# Patient Record
Sex: MALE | Race: WHITE | NOT HISPANIC OR LATINO | Employment: FULL TIME | URBAN - METROPOLITAN AREA
[De-identification: names, ages, dates, MRNs, and addresses within clinical notes are randomized per-mention and may not be internally consistent; named-entity substitution may affect disease eponyms.]

---

## 2017-10-04 ENCOUNTER — ALLSCRIPTS OFFICE VISIT (OUTPATIENT)
Dept: OTHER | Facility: OTHER | Age: 57
End: 2017-10-04

## 2017-10-06 NOTE — PROGRESS NOTES
Assessment  1  Type 2 diabetes mellitus with hyperglycemia (250 00) (E11 65)   2  Mixed hyperlipidemia (272 2) (E78 2)   3  Benign essential hypertension (401 1) (I10)   4  Influenza vaccine needed (V04 81) (Z23)    Plan   Benign essential hypertension    · Lisinopril 2 5 MG Oral Tablet; TAKE 1 TABLET EVERY DAY  Benign essential hypertension, Mixed hyperlipidemia, Type 2 diabetes mellitus with  hyperglycemia    · Follow-up visit in 3 months Evaluation and Treatment  Follow-up  Status: Hold For -  Scheduling  Requested for: 05Oct2017  Influenza vaccine needed    · Fluzone Quadrivalent Intramuscular Suspension  Mixed hyperlipidemia    · Simvastatin 40 MG Oral Tablet; Take 1 tablet daily  Mixed hyperlipidemia, Type 2 diabetes mellitus with hyperglycemia    · (Q) CBC (H/H, RBC, INDICES, WBC, PLT); Status:Active; Requested TKO:[id number];    · (Q) COMPREHENSIVE METABOLIC PNL W/ADJUSTED CALCIUM; Status:Active; Requested JDQ:[id number];    · (Q) HEMOGLOBIN A1c W/REFL TO [facility]); Status:Active; Requested  EMH:[id number];    · (Q) LIPID PANEL WITH REFLEX TO DIRECT LDL; Status:Active; Requested  EHS:[id number];    · (Q) MICROALBUMIN, RANDOM URINE (W/CREATININE); Status:Active; Requested  WPZ:[id number];   Prostate cancer screening    · (Q) PSA, TOTAL WITH REFLEX TO PSA, FREE; Status:Active; Requested TQB:[id number];   Screening for malignant neoplasm of colon    · *1 - [facility]  *  Status: Active   Requested for: 49FGZ4513  Care Summary provided  : Yes   · COLONOSCOPY; Status:Active; Requested for:04Oct2017;   Type 2 diabetes mellitus with hyperglycemia    · OneTouch Ultra Blue In Vitro Strip; TEST 8 TIMES DAILY   · HumaLOG KwikPen 100 UNIT/ML Subcutaneous Solution Pen-injector; inject 5  units based on carb counting SC after meals   · Lantus SoloStar 100 UNIT/ML Subcutaneous Solution Pen-injector; Inject 50  units in the morning   · MetFORMIN HCl - 500 MG Oral Tablet;  Take 1 tablet po BID   · *VB - Foot Exam; Status:Complete;   Done: 02BBS2152 06:22PM    *1 - [facility]  *  Status: Need Information - Financial Authorization  Requested for: [id number]  Ordered; For: Type 2 diabetes mellitus with hyperglycemia; Ordered By: Dangelo Patterson  Performed:   Due: 22HAH1971  Follow-up visit in 3 months Evaluation and Treatment  Follow-up  Status: Hold For - Scheduling  Requested for: 75NWC0301  Ordered; For: Mixed hyperlipidemia, Type 2 diabetes mellitus with hyperglycemia; Ordered By: Dangelo Patterson  Performed:   Due: 69PLP6720     Discussion/Summary    Patient is a 49-year-old male here today for med check for his chronic conditions  Unfortunately his last med check was 2015 and has not been compliant with taking his medications, getting blood work or following up in the office  has no recent blood work to review and his current status of his diabetes is unknown  Unfortunately patient continues to change the units on his Lantus on his own and is currently taking 50 units daily  He reports that he alternates between Humalog and novolog and I'm uncertain where he got the novolog from  It appears according to his past medication list that we had given him novolog, samples, in  but these would more than likely be   spent at least 50% of the visit discussing with patient the importance of compliance in all aspects of his diabetic care  He admits to continuing to eat fast food on a daily basis which looking back at previous office visits have been discussed with him dating back to at least   I once again offered him referral to nutritionist and he accepts so I did provide him with a referral to our diabetic nutritionist and advise he contact them  Medication is another aspect of his treatment and I told him that he must comply with his medication treatment and avoid switching and changing medications on his own   He has not been taking metformin since it is been prescribed for him in 2015 and I will have patient restart this to be taken one tablet twice a day  He will commit to his Lantus and I will maintain him currently at 50 units and Humalog before meals sticking to 5 units  He was advised to make sure he eats 3 meals a day  Hopefully nutrition will be able to get him on track with his dietary habits  He was provided with glucose monitoring logs and advised that he check his sugars every day including a fasting a m  and 1-2 hour postprandial after his largest meal  He should bring these logs with him to every visit  his hyperlipidemia, once again I do not know the current status of the condition as he has not had any recent blood work done  Unfortunately he admits that he also has not been taking his simvastatin  I reiterated to him the importance of maintaining good cholesterol levels to prevent cardiovascular disease  At this time I will have him restart simvastatin at current dose with medication refilled today  Reiterated the importance of avoiding fast food and eating high protein with lots of fruits and vegetables and foods that are low in fat   has been a diagnosis that has been using several times  His blood pressure is always been excellent and he is only taking 2 5 mg of lisinopril most likely more so for management of his diabetes  Unfortunately he has not been taking his lisinopril either and I educated patient about nephropathy and how uncontrolled diabetes can affect his kidneys  We will restart him on 2 5 mg of lisinopril daily  a cardiovascular standpoint I also advised that he restart his daily aspirin at 81 mg a day  he has not been taking any of his medications aside from his insulins intermittently I advised that we start fresh with all medications refilled   I gave him a printed prescription for [facility] blood work and explained to him how to fast  He will get the fasting blood work done in 3 months and follow-up shortly after to review together  He will need to have an eye exam  Foot exam up to date and done today  Screening colonoscopy ordered given  flu vaccine admin today  explained to patient kindly that if he does not follow up as discussed and get his fasting blood work done we will no longer be able to provide his care or prescribe his medications for him  Possible side effects of new medications were reviewed with the patient/guardian today  The treatment plan was reviewed with the patient/guardian  The patient/guardian understands and agrees with the treatment plan      History of Present Illness  The patient is here today for a follow-up visit  His diabetes mellitus type 2 is unstable  the patient is not adherent with his medication regimen -He denies medication side effects  (pt states he only takes 50units lantus in AM  humalog as needed, some days doesnt use it, however he states he also has novolog in his fridge that he may use or interchange  he checks sugars 2 x daily rarely, but hasnt checked it recently except for before appt today - He checked sugar 5 min after eating and was 176)  His hyperlipidemia has been unsure how lipids are - no recent BW  His LDL goal is 100 mg/dL  the patient is not adherent with his medication regimen -pt has not been taking simvastatin  He states when sugars get low he sweats and confused, occurs about once every other week  Symptoms: denies chest pain,-denies intermittent leg claudication,-denies dyspnea,-denies lower extremity edema,-denies exercise intolerance,-denies fatigue,-denies numbness of the feet,-denies foot pain,-denies a foot ulcer,-denies visual impairment,-denies muscle pain-and-denies muscle weakness  Associated symptoms include no polyuria,-no PND-and-no polydipsia-   The patient presents with complaints of no syncope (hx of syncope after drinking ETOH, outside in heat at a concert, took insulin but didnt eat  has not had syncopal episode since)  Lifestyle and Disease Management: Diet: He does not have a healthy diet  -pt admits that he eats excessive fast food, doesnt cook  Weight Issues: He does not have any weight concerns  Exercise: He does not exercise regularly  Smoking: He does not use tobacco  Alcohol: He consumes alcohol -social  Drug Use: He denies drug use  Goals: the patient is not doing well with his goals  -pt feels well but not compliant at all with any medications or recommendations for treatment of his conditions  Review of Systems    Constitutional: No fever or chills, feels well, no tiredness, no recent weight gain or weight loss  Eyes: No complaints of eye pain, no red eyes, no discharge from eyes, no itchy eyes  ENT: no complaints of earache, no hearing loss, no nosebleeds, no nasal discharge, no sore throat, no hoarseness  Cardiovascular: No complaints of slow heart rate, no fast heart rate, no chest pain, no palpitations, no leg claudication, no lower extremity  Respiratory: No complaints of shortness of breath, no wheezing, no cough, no SOB on exertion, no orthopnea or PND  Gastrointestinal: No complaints of abdominal pain, no constipation, no nausea or vomiting, no diarrhea or bloody stools  Genitourinary: No complaints of dysuria, no incontinence, no hesitancy, no nocturia, no genital lesion, no testicular pain  Musculoskeletal: No complaints of arthralgia, no myalgias, no joint swelling or stiffness, no limb pain or swelling  Integumentary: No complaints of skin rash or skin lesions, no itching, no skin wound, no dry skin  Neurological: No compliants of headache, no confusion, no convulsions, no numbness or tingling, no dizziness or fainting, no limb weakness, no difficulty walking  Psychiatric: Is not suicidal, no sleep disturbances, no anxiety or depression, no change in personality, no emotional problems     Endocrine: No complaints of proptosis, no hot flashes, no muscle weakness, no erectile dysfunction, no deepening of the voice, no feelings of weakness  Hematologic/Lymphatic: No complaints of swollen glands, no swollen glands in the neck, does not bleed easily, no easy bruising  Active Problems  1  Benign essential hypertension (401 1) (I10)   2  Mixed hyperlipidemia (272 2) (E78 2)   3  Type 2 diabetes mellitus with hyperglycemia (250 00) (E11 65)    Past Medical History  1  History of Acute otitis externa of left ear (380 10) (H60 502)   2  History of acute bronchitis (V12 69) (Z87 09)   3  History of acute sinusitis (V12 69) (Z87 09)   4  History of athlete's foot (V12 09) (Z86 19)   5  History of nausea (V12 79) (Z87 898)   6  History of type 2 diabetes mellitus (V12 29) (Z86 39)   7  History of Impacted cerumen of both ears (380 4) (H61 23)   8  History of Laceration Of Lower Leg (891 0)   9  History of Left ear pain (388 70) (H92 02)   10  History of Myalgia (729 1) (M79 1)   11  History of Otitis externa of right ear (380 10) (H60 91)   12  History of Screening for colon cancer (V76 51) (Z12 11)   13  History of Sore throat (462) (J02 9)   14  History of Type 2 diabetes mellitus with hyperglycemia (250 00) (E11 65)    Social History   · Alcohol use (V49 89) (Z78 9)   · Never a smoker  The social history was reviewed and updated today  Current Meds   1  Aspirin 81 MG TABS; Therapy: (Recorded:10Pke6769) to Recorded   2  HumaLOG KwikPen 100 UNIT/ML Subcutaneous Solution Pen-injector; inject 5-8 units   based on carb counting SC after meals; Therapy: 36TMF5361 to (Last Rx:94Npc0638)  Requested for: 29HOC1435 Ordered   3  Lantus SoloStar 100 UNIT/ML Subcutaneous Solution Pen-injector; INJECT 55 UNITS IN   THE MORNING; Therapy: 33KKY1148 to ()  Requested for: 32Zhw9807; Last   Rx:[id number] Ordered   4  Lisinopril 2 5 MG Oral Tablet; TAKE 1 TABLET EVERY DAY; Therapy: 93BAQ0172 to (Lora Buchanan)  Requested for: 11Aug2015; Last   Rx:11Aug2015 Ordered   5  MetFORMIN HCl - 500 MG Oral Tablet; Take 1 tablet po BID; Therapy: 55Fzw9650 to (Last [place])  Requested for: 06Uwi4608 Ordered   6  Simvastatin 40 MG Oral Tablet; Take 1 tablet daily; Therapy: 71ZLV8542 to (Last Rx:54Udv4956)  Requested for: 74YWD0410 Ordered    The medication list was reviewed and updated today  Allergies  1  Bactrim DS TABS    Vitals  Vital Signs    Recorded: 81OBZ5274 06:10PM   Temperature 96 3 F, Tympanic   Heart Rate 88   Pulse Quality Normal   Respiration Quality Normal   Respiration 16   Systolic 505, LUE, Sitting   Diastolic 64, LUE, Sitting   BP CUFF SIZE Large   Height 6 ft    Weight 161 lb 2 oz   BMI Calculated 21 85   BSA Calculated 1 94   O2 Saturation 98, RA     Physical Exam    Constitutional   General appearance: No acute distress, well appearing and well nourished  appears healthy,-within normal limits of ideal weight-and-appearance reflects stated age  Eyes   Conjunctiva and lids: No swelling, erythema, or discharge  Pulmonary   Respiratory effort: No increased work of breathing or signs of respiratory distress  Auscultation of lungs: Clear to auscultation, equal breath sounds bilaterally, no wheezes, no rales, no rhonci  Cardiovascular   Auscultation of heart: Normal rate and rhythm, normal S1 and S2, without murmurs  Examination of extremities for edema and/or varicosities: Normal  -pedal pulses 2+ B/L  Carotid pulses: Normal     Lymphatic   Palpation of lymph nodes in neck: No lymphadenopathy  Musculoskeletal   Gait and station: Normal     Psychiatric   Orientation to person, place and time: Normal     Mood and affect: Normal  -pt lacking insight  Additional Exam:  vitals reviewed - WNL  Socks and shoes removed, Right Foot Findings: dry, but no swelling, no tenderness, no erythematous, no warmth, without ulcers and without a callus   The right toes were normal    The sensory exam showed normal vibratory sensation at the level of the toes on the right  Normal tactile sensation with monofilament testing throughout the right foot  Socks and shoes removed, Left Foot Findings: dry, but no swelling, no tenderness, no erythematous, no warmth, without ulcers and without a callus  The left toes were normal    The sensory exam showed normal vibratory sensation at the level of the toes on the left  Normal tactile sensation with monofilament testing throughout the left foot  Pulses:   2+ in the dorsalis pedis on the right  Pulses:   2+ in the dorsalis pedis on the left  Assign Risk Category: 0: No loss of protective sensation, no deformity  No present risk  Results/Data  PHQ-9 Adult Depression Screening 73TJC9342 06:26PM User, 3D[employer]     Test Name Result Flag Reference   PHQ-9 Adult Depression Score 4     Over the last two weeks, how often have you been bothered by any of the following problems? Little interest or pleasure in doing things: Not at all - 0  Feeling down, depressed, or hopeless: Not at all - 0  Trouble falling or staying asleep, or sleeping too much: Several days - 1  Feeling tired or having little energy: Several days - 1  Poor appetite or over eating: Several days - 1  Feeling bad about yourself - or that you are a failure or have let yourself or your family down: Not at all - 0  Trouble concentrating on things, such as reading the newspaper or watching television: Several days - 1  Moving or speaking so slowly that other people could have noticed   Or the opposite -  being so fidgety or restless that you have been moving around a lot more than usual: Not at all - 0  Thoughts that you would be better off dead, or of hurting yourself in some way: Not at all - 0   PHQ-9 Adult Depression Screening Negative     PHQ-9 Difficulty Level Not difficult at all     PHQ-9 Severity Minimal Depression       *VB - Foot Exam 28SJF5576 06:22PM [facility]     Test Name Result Flag Reference   FOOT EXAM 10/4/2017-NORMAL         Signatures Electronically signed by : Jaimee Olea [facility]; Oct  5 2017 12:30PM EST                       (Author)    Electronically signed by : Aaron Simons DO; Oct  5 2017  1:02PM EST

## 2018-01-14 VITALS
RESPIRATION RATE: 16 BRPM | HEIGHT: 72 IN | DIASTOLIC BLOOD PRESSURE: 64 MMHG | SYSTOLIC BLOOD PRESSURE: 110 MMHG | TEMPERATURE: 96.3 F | OXYGEN SATURATION: 98 % | BODY MASS INDEX: 21.83 KG/M2 | HEART RATE: 88 BPM | WEIGHT: 161.13 LBS

## 2018-05-24 ENCOUNTER — OFFICE VISIT (OUTPATIENT)
Dept: FAMILY MEDICINE CLINIC | Facility: CLINIC | Age: 58
End: 2018-05-24
Payer: COMMERCIAL

## 2018-05-24 VITALS
DIASTOLIC BLOOD PRESSURE: 66 MMHG | HEART RATE: 76 BPM | WEIGHT: 164.2 LBS | OXYGEN SATURATION: 98 % | SYSTOLIC BLOOD PRESSURE: 130 MMHG | TEMPERATURE: 96.8 F | HEIGHT: 72 IN | BODY MASS INDEX: 22.24 KG/M2 | RESPIRATION RATE: 16 BRPM

## 2018-05-24 DIAGNOSIS — I10 BENIGN ESSENTIAL HYPERTENSION: ICD-10-CM

## 2018-05-24 DIAGNOSIS — Z11.59 NEED FOR HEPATITIS C SCREENING TEST: ICD-10-CM

## 2018-05-24 DIAGNOSIS — E78.2 MIXED HYPERLIPIDEMIA: ICD-10-CM

## 2018-05-24 DIAGNOSIS — Z12.5 SCREENING FOR PROSTATE CANCER: ICD-10-CM

## 2018-05-24 DIAGNOSIS — E11.65 TYPE 2 DIABETES MELLITUS WITH HYPERGLYCEMIA, UNSPECIFIED WHETHER LONG TERM INSULIN USE (HCC): Primary | ICD-10-CM

## 2018-05-24 LAB
LEFT EYE DIABETIC RETINOPATHY: NORMAL
RIGHT EYE DIABETIC RETINOPATHY: NORMAL
SL AMB POCT HEMOGLOBIN AIC: 8.8

## 2018-05-24 PROCEDURE — 2022F DILAT RTA XM EVC RTNOPTHY: CPT | Performed by: PHYSICIAN ASSISTANT

## 2018-05-24 PROCEDURE — 83036 HEMOGLOBIN GLYCOSYLATED A1C: CPT | Performed by: PHYSICIAN ASSISTANT

## 2018-05-24 PROCEDURE — 92250 FUNDUS PHOTOGRAPHY W/I&R: CPT | Performed by: PHYSICIAN ASSISTANT

## 2018-05-24 PROCEDURE — 3045F PR MOST RECENT HEMOGLOBIN A1C LEVEL 7.0-9.0%: CPT | Performed by: PHYSICIAN ASSISTANT

## 2018-05-24 PROCEDURE — 3072F LOW RISK FOR RETINOPATHY: CPT | Performed by: PHYSICIAN ASSISTANT

## 2018-05-24 PROCEDURE — 99214 OFFICE O/P EST MOD 30 MIN: CPT | Performed by: PHYSICIAN ASSISTANT

## 2018-05-24 RX ORDER — INSULIN GLARGINE 100 [IU]/ML
45 INJECTION, SOLUTION SUBCUTANEOUS DAILY
COMMUNITY
Start: 2018-05-22 | End: 2018-10-15 | Stop reason: SDUPTHER

## 2018-05-24 RX ORDER — SIMVASTATIN 40 MG
1 TABLET ORAL DAILY
COMMUNITY
Start: 2012-09-18 | End: 2018-07-14 | Stop reason: SDUPTHER

## 2018-05-24 RX ORDER — LISINOPRIL 2.5 MG/1
1 TABLET ORAL DAILY
COMMUNITY
Start: 2013-01-29 | End: 2018-07-14 | Stop reason: SDUPTHER

## 2018-05-24 NOTE — PROGRESS NOTES
Assessment/Plan:    Type 2 diabetes mellitus with hyperglycemia (HCC)  poc A1C 8 8, still uncontrolled  He is noncompliant with insulin, as well as diet  Will decrease lantus from 50units to 45units, but stressed need to use humalog before every meal he eats  Gave him paper with instructions written of what sugars to check (fasting AM and 1hr postprandial)  He also should f/u with nutritionist for help managing diet  On ACE, statin, foot exam UTD  Eye exam in office today  Benign essential hypertension  130/66 today, more so on ACE for diabetes  Will monitor  Mixed hyperlipidemia  Compliant on simvastatin  Will check lipids soon  Diagnoses and all orders for this visit:    Type 2 diabetes mellitus with hyperglycemia, unspecified whether long term insulin use (HCC)  -     POCT hemoglobin A1c  -     glucose blood (ONE TOUCH ULTRA TEST) test strip; Test blood sugars 3 times a day  -     CBC and differential; Future  -     Comprehensive metabolic panel; Future  -     Lipid panel; Future  -     PSA, total and free; Future  -     CBC and differential  -     Comprehensive metabolic panel  -     Lipid panel  -     PSA, total and free  -     POCT diabetic eye exam    Benign essential hypertension  -     CBC and differential; Future  -     Comprehensive metabolic panel; Future  -     Lipid panel; Future  -     PSA, total and free; Future  -     CBC and differential  -     Comprehensive metabolic panel  -     Lipid panel  -     PSA, total and free    Mixed hyperlipidemia  -     CBC and differential; Future  -     Comprehensive metabolic panel; Future  -     Lipid panel; Future  -     PSA, total and free; Future  -     CBC and differential  -     Comprehensive metabolic panel  -     Lipid panel  -     PSA, total and free    Screening for prostate cancer  -     PSA, total and free;  Future  -     PSA, total and free    Need for hepatitis C screening test  -     Hepatitis C Ab W/Refl To HCV RNA, Qn, PCR; Future  - Hepatitis C Ab W/Refl To HCV RNA, Qn, PCR    Other orders  -     aspirin 81 MG tablet; Take by mouth  -     insulin lispro (HUMALOG KWIKPEN) 100 Units/mL SOPN; Inject under the skin  -     LANTUS SOLOSTAR injection pen 100 units/mL;   -     lisinopril (ZESTRIL) 2 5 mg tablet; Take 1 tablet by mouth daily  -     metFORMIN (GLUCOPHAGE) 500 mg tablet; Take 1 tablet by mouth 2 (two) times a day  -     Discontinue: glucose blood (ONE TOUCH ULTRA TEST) test strip; by In Vitro route  -     simvastatin (ZOCOR) 40 mg tablet; Take 1 tablet by mouth daily        Patient is a 59-year-old male here for reported low blood sugars and not feeling well  He states he had 1 blood sugar in the middle the night that was 48 and woke confused and sweating, improved after eating  He states he had 2 other episodes in the middle the night very similar but did not check his sugars  Unfortunately patient has a non compliant past and still has yet to get blood work done after emphasizing the importance of compliance at his last appointment with me in October 2017  Point of care A1c collected today was 8 8, still uncontrolled  I question patient great detail today regarding his compliance yet again with his medications, in particular his insulin  He admits to fairly decent compliance with his Lantus 50 units in the morning  However it seems he misses his Humalog quite frequently and unfortunately he continues with a very poor diet which I feel is where his issue is with his poorly controlled diabetes  I explained to patient that  With diabetes and is insulin use he needs to be very strict and compliant with using his insulin but also with his eating  I feel that his A1c is higher most likely because of high post prandials but possibly his Lantus is too much in general during his fasting times    I have asked patient multiple times to keep a blood sugar log both fasting and postprandial and was given a log book at last appointment but he states he did not understand  He also did not get blood work done from October because he states he did not understand was on the paper and he has not had any blood work done since 2015  At this point I will decrease his Lantus to 45 units  I have advised 5 units of Humalog 15 minutes before his meals, and he is only to skip the Humalog if he skips his meals, which he should not be skipping meals regardless  I told pt he should be fasting 12 hours for his BW and told him not to worry about what the paper says, just to take it to the lab  He will continue all orals  We will f/u with him in 1 month, he is to get FBW prior  He was sent over for an eye exam today  Will discuss results at f/u  Foot exam UTD  Chief Complaint   Patient presents with    Hypoglycemia     missed work on tuesday was 48       Subjective:      Patient ID: Trisha Jones is a 62 y o  male     59y/o male here today for f/u to his chronic conditions, concerned because he had 3 low BS and wasn't feel well, feeling tired and run down  Also work stress  States all 3 low BS was in middle of night, states he was confused and sweaty (was 48 one time, but didn't check other times)  States didn't feel well for 4 hours  Currently taking 50 units of lantus in AM  He is still eating TV dinners  Breakfast he eats jelly sandwhich or Kinyarwanda toast in AM, then sandwhich for lunch  He eats popcorn and cucumber at night  He admits sometimes frequently he does not use his humalog before meals, he doesn't know why  When he does use humalog, he states he uses carb count method  There are days he doesn't use it at all  Drinks 2-3 bottles of water and occasional beer at night (usually at least 40oz/night)  He still has not gotten FBW done from when I last saw him oct 2017, as well as did not schedule colonoscopy, because he states he did not understand what was on the papers          The following portions of the patient's history were reviewed and updated as appropriate: allergies, current medications, past family history, past medical history, past social history, past surgical history and problem list     Review of Systems   Constitutional: Positive for fatigue  Respiratory: Negative  Cardiovascular: Negative  Gastrointestinal: Negative  Genitourinary: Negative  Neurological:        Reports some confusion with low BS   Psychiatric/Behavioral: Negative  Objective:      /66 (BP Location: Left arm, Cuff Size: Adult)   Pulse 76   Temp (!) 96 8 °F (36 °C) (Tympanic)   Resp 16   Ht 6' (1 829 m)   Wt 74 5 kg (164 lb 3 2 oz)   SpO2 98%   BMI 22 27 kg/m²          Physical Exam   Constitutional: He is oriented to person, place, and time  He appears well-developed and well-nourished  Neck: Neck supple  Normal carotid pulses present  Carotid bruit is not present  Cardiovascular: Normal rate, regular rhythm, normal heart sounds and normal pulses  Pulmonary/Chest: Effort normal and breath sounds normal    Abdominal: Normal appearance and bowel sounds are normal  There is no tenderness  Lymphadenopathy:     He has no cervical adenopathy  Neurological: He is alert and oriented to person, place, and time  Psychiatric: He has a normal mood and affect  Vitals reviewed

## 2018-05-24 NOTE — ASSESSMENT & PLAN NOTE
poc A1C 8 8, still uncontrolled  He is noncompliant with insulin, as well as diet  Will decrease lantus from 50units to 45units, but stressed need to use humalog before every meal he eats  Gave him paper with instructions written of what sugars to check (fasting AM and 1hr postprandial)  He also should f/u with nutritionist for help managing diet  On ACE, statin, foot exam UTD  Eye exam in office today

## 2018-05-30 LAB
ALBUMIN SERPL-MCNC: 4.2 G/DL (ref 3.6–5.1)
ALBUMIN/GLOB SERPL: 1.5 (CALC) (ref 1–2.5)
ALP SERPL-CCNC: 77 U/L (ref 40–115)
ALT SERPL-CCNC: 20 U/L (ref 9–46)
AST SERPL-CCNC: 13 U/L (ref 10–35)
BASOPHILS # BLD AUTO: 33 CELLS/UL (ref 0–200)
BASOPHILS NFR BLD AUTO: 0.5 %
BILIRUB SERPL-MCNC: 1.7 MG/DL (ref 0.2–1.2)
BUN SERPL-MCNC: 14 MG/DL (ref 7–25)
BUN/CREAT SERPL: ABNORMAL (CALC) (ref 6–22)
CALCIUM SERPL-MCNC: 9.4 MG/DL (ref 8.6–10.3)
CHLORIDE SERPL-SCNC: 101 MMOL/L (ref 98–110)
CHOLEST SERPL-MCNC: 214 MG/DL
CHOLEST/HDLC SERPL: 3 (CALC)
CO2 SERPL-SCNC: 31 MMOL/L (ref 20–31)
CREAT SERPL-MCNC: 0.77 MG/DL (ref 0.7–1.33)
EOSINOPHIL # BLD AUTO: 59 CELLS/UL (ref 15–500)
EOSINOPHIL NFR BLD AUTO: 0.9 %
ERYTHROCYTE [DISTWIDTH] IN BLOOD BY AUTOMATED COUNT: 12.7 % (ref 11–15)
GLOBULIN SER CALC-MCNC: 2.8 G/DL (CALC) (ref 1.9–3.7)
GLUCOSE SERPL-MCNC: 83 MG/DL (ref 65–99)
HCT VFR BLD AUTO: 48.3 % (ref 38.5–50)
HCV AB S/CO SERPL IA: 0
HCV AB SERPL QL IA: NORMAL
HDLC SERPL-MCNC: 71 MG/DL
HGB BLD-MCNC: 16.1 G/DL (ref 13.2–17.1)
LDLC SERPL CALC-MCNC: 128 MG/DL (CALC)
LYMPHOCYTES # BLD AUTO: 1450 CELLS/UL (ref 850–3900)
LYMPHOCYTES NFR BLD AUTO: 22.3 %
MCH RBC QN AUTO: 28.9 PG (ref 27–33)
MCHC RBC AUTO-ENTMCNC: 33.3 G/DL (ref 32–36)
MCV RBC AUTO: 86.6 FL (ref 80–100)
MONOCYTES # BLD AUTO: 468 CELLS/UL (ref 200–950)
MONOCYTES NFR BLD AUTO: 7.2 %
NEUTROPHILS # BLD AUTO: 4492 CELLS/UL (ref 1500–7800)
NEUTROPHILS NFR BLD AUTO: 69.1 %
NONHDLC SERPL-MCNC: 143 MG/DL (CALC)
PLATELET # BLD AUTO: 260 THOUSAND/UL (ref 140–400)
PMV BLD REES-ECKER: 10.2 FL (ref 7.5–12.5)
POTASSIUM SERPL-SCNC: 4.5 MMOL/L (ref 3.5–5.3)
PROT SERPL-MCNC: 7 G/DL (ref 6.1–8.1)
PSA FREE MFR SERPL: 17 % (CALC)
PSA FREE SERPL-MCNC: 0.1 NG/ML
PSA SERPL-MCNC: 0.6 NG/ML
RBC # BLD AUTO: 5.58 MILLION/UL (ref 4.2–5.8)
SL AMB EGFR AFRICAN AMERICAN: 117 ML/MIN/1.73M2
SL AMB EGFR NON AFRICAN AMERICAN: 101 ML/MIN/1.73M2
SODIUM SERPL-SCNC: 139 MMOL/L (ref 135–146)
TRIGL SERPL-MCNC: 54 MG/DL
WBC # BLD AUTO: 6.5 THOUSAND/UL (ref 3.8–10.8)

## 2018-06-25 ENCOUNTER — OFFICE VISIT (OUTPATIENT)
Dept: FAMILY MEDICINE CLINIC | Facility: CLINIC | Age: 58
End: 2018-06-25
Payer: COMMERCIAL

## 2018-06-25 VITALS
RESPIRATION RATE: 16 BRPM | HEIGHT: 72 IN | SYSTOLIC BLOOD PRESSURE: 134 MMHG | WEIGHT: 163.1 LBS | TEMPERATURE: 98.7 F | HEART RATE: 95 BPM | DIASTOLIC BLOOD PRESSURE: 68 MMHG | OXYGEN SATURATION: 97 % | BODY MASS INDEX: 22.09 KG/M2

## 2018-06-25 DIAGNOSIS — E11.65 TYPE 2 DIABETES MELLITUS WITH HYPERGLYCEMIA, UNSPECIFIED WHETHER LONG TERM INSULIN USE (HCC): Primary | ICD-10-CM

## 2018-06-25 PROCEDURE — 99213 OFFICE O/P EST LOW 20 MIN: CPT | Performed by: PHYSICIAN ASSISTANT

## 2018-06-25 NOTE — ASSESSMENT & PLAN NOTE
BP elevated originally but recheck is 134/68  I will continue monitoring blood pressure and increase lisinopril if necessary

## 2018-06-25 NOTE — ASSESSMENT & PLAN NOTE
Lab Results   Component Value Date    HGBA1C 8 8 05/24/2018       Patient's A1c is 8 8 but his fasting sugar was recorded at 83  He has  Decreased his daily Lantus to 45 units a day and I have advised 5 units of Humalog before every meal when he eats the patient seems he has still not been compliant with this  He unfortunately has not brought in his log book that I gave him at his last appointment a month ago and admits he is not been compliant with checking sugars at home  I believe his poorly controlled diabetes is  Most likely secondary to his poor diet and spiked post prandials as well as his issues with eating and consistently and missing meals which can cause hypoglycemia  This has all been discussed with patient before and I told him I will be able to better help him with his diabetes when he is able to eat regularly breakfast lunch and dinner, avoiding eating excessive sugars and carbohydrates and keeping track of his fasting a m  Sugar and 1-2 hour post prandials after his meals  He is to record them in the log book I gave him and follow-up in 6-8 weeks

## 2018-06-25 NOTE — PROGRESS NOTES
Assessment/Plan:    Type 2 diabetes mellitus with hyperglycemia (HCC)  Lab Results   Component Value Date    HGBA1C 8 8 05/24/2018       Patient's A1c is 8 8 but his fasting sugar was recorded at 83  He has  Decreased his daily Lantus to 45 units a day and I have advised 5 units of Humalog before every meal when he eats the patient seems he has still not been compliant with this  He unfortunately has not brought in his log book that I gave him at his last appointment a month ago and admits he is not been compliant with checking sugars at home  I believe his poorly controlled diabetes is  Most likely secondary to his poor diet and spiked post prandials as well as his issues with eating and consistently and missing meals which can cause hypoglycemia  This has all been discussed with patient before and I told him I will be able to better help him with his diabetes when he is able to eat regularly breakfast lunch and dinner, avoiding eating excessive sugars and carbohydrates and keeping track of his fasting a m  Sugar and 1-2 hour post prandials after his meals  He is to record them in the log book I gave him and follow-up in 6-8 weeks  Benign essential hypertension    BP elevated originally but recheck is 134/68  I will continue monitoring blood pressure and increase lisinopril if necessary  Mixed hyperlipidemia    Total cholesterol 214, HDL 71, triglycerides 54,  on 40 mg of simvastatin  Ratio 3 0       Diagnoses and all orders for this visit:    Type 2 diabetes mellitus with hyperglycemia, unspecified whether long term insulin use [facility])       60-year-old male here today for follow-up of diabetes and to review fasting and postprandial sugars  Unfortunately patient admits he has not been compliant about checking his sugars at home and did not bring the log book with him today  He states that he works as a  and his hands are dirty a lot so he is afraid to test his sugars    I told patient to at least check fasting a m  Sugars before he goes to work and then he can check postprandial on the weekends if necessary  I did give him some alcohol pads that he can clean his fingers before testing  I told him that the blood sugars are essential in determining how I can further manage his diabetes as his fasting sugar for the blood work was excellent at 83  He also has still been noncompliant with his diet, eating higher carbs /breads as well as sugary foods such as jelly and he states he has also been skipping meals as well which I advised him not to do on insulins  I do not feel he will be compliant seeing an endocrinologist for further management as he states he was unable to see nutritionist due to the hours  Patient advised to check his sugars as instructed and to bring in the log book and we will see him back in 4-6 weeks  I did decrease his Lantus at his last appointment due to hypoglycemic episodes from 50 units to 45 units and he was supposed to use 5 units of Humalog prior to his meals which I do not think he is doing but reiterated to him this change  Chief Complaint   Patient presents with    Follow-up       Subjective:      Patient ID: Dillon Cogan is a 62 y o  male     59y/o male here today for diabetes recheck  He has not checked his sugars at home, stating he cannot at work because his fingers are dirty  He did not bring a log  He states "i eat the same thing every day " He was not able to make appt with nutritionist because it doesn't fit his schedule  The following portions of the patient's history were reviewed and updated as appropriate: allergies, current medications, past family history, past medical history, past social history, past surgical history and problem list     Review of Systems   Constitutional: Negative  Respiratory: Negative  Cardiovascular: Negative  Gastrointestinal: Negative  Genitourinary: Negative  Neurological: Negative  Psychiatric/Behavioral: Negative  Objective:      /68   Pulse 95   Temp 98 7 °F (37 1 °C) (Tympanic)   Resp 16   Ht 6' (1 829 m)   Wt 74 kg (163 lb 1 6 oz)   SpO2 97%   BMI 22 12 kg/m²          Physical Exam   Constitutional: He is oriented to person, place, and time  He appears well-developed and well-nourished  Neck: Neck supple  Cardiovascular: Normal rate, normal heart sounds and intact distal pulses  No LE swelling   Pulmonary/Chest: Effort normal and breath sounds normal    Lymphadenopathy:     He has no cervical adenopathy  Neurological: He is alert and oriented to person, place, and time  Psychiatric: He has a normal mood and affect  Vitals reviewed

## 2018-07-14 DIAGNOSIS — E78.2 MIXED HYPERLIPIDEMIA: ICD-10-CM

## 2018-07-14 DIAGNOSIS — Z79.4 TYPE 2 DIABETES MELLITUS WITHOUT COMPLICATION, WITH LONG-TERM CURRENT USE OF INSULIN (HCC): Primary | ICD-10-CM

## 2018-07-14 DIAGNOSIS — E11.9 TYPE 2 DIABETES MELLITUS WITHOUT COMPLICATION, WITH LONG-TERM CURRENT USE OF INSULIN (HCC): Primary | ICD-10-CM

## 2018-07-16 PROCEDURE — 4010F ACE/ARB THERAPY RXD/TAKEN: CPT | Performed by: PHYSICIAN ASSISTANT

## 2018-07-16 RX ORDER — LISINOPRIL 2.5 MG/1
TABLET ORAL
Qty: 90 TABLET | Refills: 1 | Status: SHIPPED | OUTPATIENT
Start: 2018-07-16 | End: 2019-12-13 | Stop reason: SDUPTHER

## 2018-07-16 RX ORDER — SIMVASTATIN 40 MG
TABLET ORAL
Qty: 90 TABLET | Refills: 1 | Status: SHIPPED | OUTPATIENT
Start: 2018-07-16 | End: 2019-05-27 | Stop reason: SDUPTHER

## 2018-09-24 ENCOUNTER — OFFICE VISIT (OUTPATIENT)
Dept: FAMILY MEDICINE CLINIC | Facility: CLINIC | Age: 58
End: 2018-09-24
Payer: COMMERCIAL

## 2018-09-24 VITALS
OXYGEN SATURATION: 98 % | HEIGHT: 71 IN | SYSTOLIC BLOOD PRESSURE: 124 MMHG | DIASTOLIC BLOOD PRESSURE: 68 MMHG | BODY MASS INDEX: 22.71 KG/M2 | WEIGHT: 162.2 LBS | HEART RATE: 85 BPM | RESPIRATION RATE: 17 BRPM | TEMPERATURE: 97.8 F

## 2018-09-24 DIAGNOSIS — E78.2 MIXED HYPERLIPIDEMIA: ICD-10-CM

## 2018-09-24 DIAGNOSIS — Z12.11 SCREENING FOR COLON CANCER: ICD-10-CM

## 2018-09-24 DIAGNOSIS — Z23 NEEDS FLU SHOT: ICD-10-CM

## 2018-09-24 DIAGNOSIS — E11.649 TYPE 2 DIABETES MELLITUS WITH HYPOGLYCEMIA WITHOUT COMA, WITH LONG-TERM CURRENT USE OF INSULIN (HCC): Primary | ICD-10-CM

## 2018-09-24 DIAGNOSIS — Z79.4 TYPE 2 DIABETES MELLITUS WITH HYPOGLYCEMIA WITHOUT COMA, WITH LONG-TERM CURRENT USE OF INSULIN (HCC): Primary | ICD-10-CM

## 2018-09-24 DIAGNOSIS — I10 BENIGN ESSENTIAL HYPERTENSION: ICD-10-CM

## 2018-09-24 LAB
RIGHT EYE DIABETIC RETINOPATHY: NORMAL
RIGHT EYE IMAGE QUALITY: NORMAL
SEVERITY (EYE EXAM): NORMAL

## 2018-09-24 PROCEDURE — 99214 OFFICE O/P EST MOD 30 MIN: CPT | Performed by: PHYSICIAN ASSISTANT

## 2018-09-24 PROCEDURE — 3074F SYST BP LT 130 MM HG: CPT | Performed by: PHYSICIAN ASSISTANT

## 2018-09-24 PROCEDURE — 3078F DIAST BP <80 MM HG: CPT | Performed by: PHYSICIAN ASSISTANT

## 2018-09-24 PROCEDURE — 90682 RIV4 VACC RECOMBINANT DNA IM: CPT | Performed by: PHYSICIAN ASSISTANT

## 2018-09-24 PROCEDURE — 3072F LOW RISK FOR RETINOPATHY: CPT | Performed by: PHYSICIAN ASSISTANT

## 2018-09-24 PROCEDURE — 90471 IMMUNIZATION ADMIN: CPT | Performed by: PHYSICIAN ASSISTANT

## 2018-09-24 NOTE — PROGRESS NOTES
Assessment/Plan:    Type 2 diabetes mellitus with hyperglycemia ([facility] [address] )  Lab Results   Component Value Date    HGBA1C 8 8 05/24/2018         Patient's last point of care A1c 8 8 May 2018  Since that time he has been checking fasting a m  Sugars which have ranged widely from 50s to 170s  However he is noted to fasting sugar approximately 49 in the morning for which she was symptomatic  And other fasting sugar he had reported at 2:11 a m  Rosella Goldmann Unfortunately this is a difficult case in which she is not compliant with diet and seems somewhat noncompliant with his insulins  At this time I will refer him to Endocrinology for further help in his diabetic treatment  Last IRIS 5/2018, foot exam today  Pt on ACE and statin  Continue current med tx for now  Benign essential hypertension  BP normal today 124/68, mainly on ACE for diabetes kidney protection    Mixed hyperlipidemia  Compliant on statin  Will check lipids with upcoming BW  Diagnoses and all orders for this visit:    Type 2 diabetes mellitus with hypoglycemia without coma, with long-term current use of insulin ([facility] )  -     Cancel: Microalbumin,Urine  -     Ambulatory referral to Endocrinology; Future  -     CBC and differential; Future  -     Comprehensive metabolic panel  -     Lipid panel; Future  -     Hemoglobin A1c (w/out EAG); Future  -     CBC and differential  -     Lipid panel  -     Hemoglobin A1c (w/out EAG)  -     Microalbumin,Urine    Benign essential hypertension  -     CBC and differential; Future  -     Comprehensive metabolic panel  -     Lipid panel; Future  -     Hemoglobin A1c (w/out EAG); Future  -     CBC and differential  -     Lipid panel  -     Hemoglobin A1c (w/out EAG)    Mixed hyperlipidemia  -     CBC and differential; Future  -     Comprehensive metabolic panel  -     Lipid panel; Future  -     Hemoglobin A1c (w/out EAG);  Future  -     CBC and differential  -     Lipid panel  -     Hemoglobin A1c (w/out EAG)    Screening for colon cancer  -     Ambulatory referral to Gastroenterology; Future    Needs flu shot  -     influenza vaccine, [id number], quadrivalent, recombinant, PF, 0 5 mL, for patients 18 yr+ (FLUBLOK)        Patient is a 54-year-old male presenting today for close diabetic follow-up  I reviewed his fasting blood sugars with him today at which have a very wide range both in hypoglycemic and hyperglycemic territory  He reports compliance with medications though it seems he is not quite compliant with his insulins  I will ask for help from endocrinology for further management of his diabetes, especially since his last A1c was 8 8  He is overdue for fasting blood work so I will have him get CBC, CMP, hemoglobin A1c and lipids  Urine collected today for microalbumin so that is up-to-date  He is on an Ace and statin  Patient given referral and will contact Endocrinology for appointment  Iris exam up-to-date performed May 2018  Foot exam updated today  Flu vaccine administered  Follow-up in 4 months  Chief Complaint   Patient presents with    Follow-up     to [facility] condition diabetes type 2        Subjective:      Patient ID: Fidelina Barron is a 62 y o  male     57y/o male here today for f/u to diabetes  He continues eating TV dinners and fast food because he states he is broke  He states it is difficult to eat regularly with work and running around, also states hard to check sugars at work because hands get very dirty  He has checked AM sugars over past few weeks, no postprandial: On 9/17 felt sweaty and sugar was 49  He took sugar pill and ate  tasty cake and felt well after  Sugars ranging mainly from 50's to 170's, highest 211  He reports compliance on oral medications, he does not always take his humalog, states it varies based on if he eats or not and carb counting, he states  He has no new concerns today          The following portions of the patient's history were reviewed and updated as appropriate: allergies, current medications, past family history, past medical history, past social history, past surgical history and problem list     Review of Systems   Constitutional: Negative  Respiratory: Negative  Cardiovascular: Negative  Gastrointestinal: Negative  Genitourinary: Negative  Neurological: Negative  Psychiatric/Behavioral: Negative  Objective:      /68   Pulse 85   Temp 97 8 °F (36 6 °C) (Tympanic)   Resp 17   Ht 5' 11 26" (1 81 m)   Wt 73 6 kg (162 lb 3 2 oz)   SpO2 98%   BMI 22 46 kg/m²          Physical Exam   Constitutional: He is oriented to person, place, and time  He appears well-developed and well-nourished  No distress  Neck: Neck supple  Normal carotid pulses present  Carotid bruit is not present  Cardiovascular: Normal rate, regular rhythm, normal heart sounds and intact distal pulses  Pulses are no weak pulses  Pulses:       Dorsalis pedis pulses are 1+ on the right side, and 1+ on the left side  Pulmonary/Chest: Effort normal and breath sounds normal    Feet:   Right Foot:   Skin Integrity: Negative for ulcer, skin breakdown, erythema, warmth, callus or dry skin  Left Foot:   Skin Integrity: Negative for ulcer, skin breakdown, erythema, warmth, callus or dry skin  Neurological: He is alert and oriented to person, place, and time  Skin: Skin is intact  Psychiatric: He has a normal mood and affect  Vitals reviewed  Patient's shoes and socks removed  Right Foot/Ankle   Right Foot Inspection  Skin Exam: skin normal and skin intact no dry skin, no warmth, no callus, no erythema, no maceration, no abnormal color, no pre-ulcer, no ulcer and no callus                          Toe Exam: ROM and strength within normal limits  Sensory   Vibration: intact  Proprioception: intact   Monofilament testing: intact  Vascular  Capillary refills: < 3 seconds  The right DP pulse is 1+       Left Foot/Ankle  Left Foot Inspection  Skin Exam: skin normal and skin intactno dry skin, no warmth, no erythema, no maceration, normal color, no pre-ulcer, no ulcer and no callus                         Toe Exam: ROM and strength within normal limits                   Sensory   Vibration: intact  Proprioception: intact  Monofilament: intact  Vascular  Capillary refills: < 3 seconds  The left DP pulse is 1+  Assign Risk Category:  No deformity present; No loss of protective sensation;  No weak pulses       Risk: 0

## 2018-09-25 LAB — MICROALBUMIN UR-MCNC: 0.4 MG/DL

## 2018-09-27 NOTE — ASSESSMENT & PLAN NOTE
Lab Results   Component Value Date    HGBA1C 8 8 05/24/2018         Patient's last point of care A1c 8 8 May 2018  Since that time he has been checking fasting a m  Sugars which have ranged widely from 50s to 170s  However he is noted to fasting sugar approximately 49 in the morning for which she was symptomatic  And other fasting sugar he had reported at 2:11 a m  Melissa Martin Unfortunately this is a difficult case in which she is not compliant with diet and seems somewhat noncompliant with his insulins  At this time I will refer him to Endocrinology for further help in his diabetic treatment  Last IRIS 5/2018, foot exam today  Pt on ACE and statin  Continue current med tx for now

## 2018-09-28 ENCOUNTER — OFFICE VISIT (OUTPATIENT)
Dept: FAMILY MEDICINE CLINIC | Facility: CLINIC | Age: 58
End: 2018-09-28
Payer: COMMERCIAL

## 2018-09-28 VITALS
RESPIRATION RATE: 16 BRPM | TEMPERATURE: 97.4 F | WEIGHT: 159.2 LBS | HEART RATE: 86 BPM | SYSTOLIC BLOOD PRESSURE: 118 MMHG | HEIGHT: 72 IN | BODY MASS INDEX: 21.56 KG/M2 | OXYGEN SATURATION: 97 % | DIASTOLIC BLOOD PRESSURE: 50 MMHG

## 2018-09-28 DIAGNOSIS — J06.9 ACUTE UPPER RESPIRATORY INFECTION: Primary | ICD-10-CM

## 2018-09-28 PROCEDURE — 99213 OFFICE O/P EST LOW 20 MIN: CPT | Performed by: PHYSICIAN ASSISTANT

## 2018-09-28 RX ORDER — AZITHROMYCIN 250 MG/1
TABLET, FILM COATED ORAL
Qty: 6 TABLET | Refills: 0 | Status: SHIPPED | OUTPATIENT
Start: 2018-09-28 | End: 2018-10-02

## 2018-09-28 NOTE — PROGRESS NOTES
Assessment/Plan:         Diagnoses and all orders for this visit:    Acute upper respiratory infection  -     azithromycin (ZITHROMAX) 250 mg tablet; Take 2 tablets today then 1 tablet daily x 4 days      Discussed OTC cold meds  Fever Care, ER instructions given  F/U 5-7 days if not resolved  Chief Complaint   Patient presents with    Headache     joints hurt    Cold Like Symptoms    Nasal Congestion       Subjective:      Patient ID: Taryn Levine is a 62 y o  male  Pt presents with one day history of nasal congestion, sinus headaches, joint pain/soreness, scratchy throat, PND  Denies cough, fever, chills, N/V/D  He has taken Mucinex, Tylenol, and a decongestant  Denies hx of asthma/allergies  Does not smoke  He did receive the flu shot  The following portions of the patient's history were reviewed and updated as appropriate: He  has a past medical history of Diabetes mellitus ([facility] [address] ); Hypertension; and Type 2 diabetes mellitus ([facility] )  He   Patient Active Problem List    Diagnosis Date Noted    Type 2 diabetes mellitus with hyperglycemia ([facility] ) 08/11/2015    Benign essential hypertension 09/10/2012    Mixed hyperlipidemia 09/10/2012     He  has no past surgical history on file  His family history includes Diabetes in his paternal aunt and paternal uncle; No Known Problems in his brother, mother, and sister; Stroke in his father  He  reports that he has never smoked  He has never used smokeless tobacco  He reports that he drinks about 0 6 oz of alcohol per week   He reports that he does not use drugs    Current Outpatient Prescriptions   Medication Sig Dispense Refill    aspirin 81 MG tablet Take 81 mg by mouth daily        glucose blood (ONE TOUCH ULTRA TEST) test strip Test blood sugars 3 times a day 100 each 2    insulin lispro (HUMALOG KWIKPEN) 100 Units/mL SOPN Inject 5 Units under the skin        LANTUS SOLOSTAR injection pen 100 units/mL Inject 45 Units under the skin daily        lisinopril (ZESTRIL) 2 5 mg tablet TAKE 1 TABLET DAILY 90 tablet 1    metFORMIN (GLUCOPHAGE) 500 mg tablet TAKE 1 TABLET TWICE A  tablet 1    simvastatin (ZOCOR) 40 mg tablet TAKE 1 TABLET DAILY 90 tablet 1    azithromycin (ZITHROMAX) 250 mg tablet Take 2 tablets today then 1 tablet daily x 4 days 6 tablet 0     No current facility-administered medications for this visit  Current Outpatient Prescriptions on File Prior to Visit   Medication Sig    aspirin 81 MG tablet Take 81 mg by mouth daily      glucose blood (ONE TOUCH ULTRA TEST) test strip Test blood sugars 3 times a day    insulin lispro (HUMALOG KWIKPEN) 100 Units/mL SOPN Inject 5 Units under the skin      LANTUS SOLOSTAR injection pen 100 units/mL Inject 45 Units under the skin daily      lisinopril (ZESTRIL) 2 5 mg tablet TAKE 1 TABLET DAILY    metFORMIN (GLUCOPHAGE) 500 mg tablet TAKE 1 TABLET TWICE A DAY    simvastatin (ZOCOR) 40 mg tablet TAKE 1 TABLET DAILY     No current facility-administered medications on file prior to visit  He is allergic to sulfamethoxazole-trimethoprim       Review of Systems   Constitutional: Negative  HENT: Positive for congestion, postnasal drip, sinus pain and sore throat  Respiratory: Negative  Cardiovascular: Negative  Gastrointestinal: Negative  Genitourinary: Negative  Musculoskeletal: Positive for arthralgias  Objective:      /50 (BP Location: Left arm, Patient Position: Sitting, Cuff Size: Adult)   Pulse 86   Temp (!) 97 4 °F (36 3 °C) (Tympanic)   Resp 16   Ht 6' 0 3" (1 836 m)   Wt 72 2 kg (159 lb 3 2 oz)   SpO2 97%   BMI 21 41 kg/m²          Physical Exam   Constitutional: He is oriented to person, place, and time  He appears well-developed and well-nourished  No distress     HENT:   Right Ear: Hearing, tympanic membrane, external ear and ear canal normal    Left Ear: Hearing, tympanic membrane, external ear and ear canal normal    Nose: Mucosal edema (B/L boggy turbinates) present  Mouth/Throat: Mucous membranes are normal  Posterior oropharyngeal erythema (PND) present  No oropharyngeal exudate  Neck: Neck supple  Cardiovascular: Normal rate, regular rhythm and normal heart sounds  Pulmonary/Chest: Effort normal and breath sounds normal    Lymphadenopathy:     He has no cervical adenopathy  Neurological: He is alert and oriented to person, place, and time  Psychiatric: He has a normal mood and affect  Vitals reviewed

## 2018-10-08 ENCOUNTER — OFFICE VISIT (OUTPATIENT)
Dept: FAMILY MEDICINE CLINIC | Facility: CLINIC | Age: 58
End: 2018-10-08
Payer: COMMERCIAL

## 2018-10-08 VITALS
SYSTOLIC BLOOD PRESSURE: 142 MMHG | BODY MASS INDEX: 21.14 KG/M2 | TEMPERATURE: 97.9 F | HEART RATE: 79 BPM | DIASTOLIC BLOOD PRESSURE: 76 MMHG | RESPIRATION RATE: 16 BRPM | OXYGEN SATURATION: 98 % | HEIGHT: 72 IN | WEIGHT: 156.1 LBS

## 2018-10-08 DIAGNOSIS — R11.0 NAUSEA: ICD-10-CM

## 2018-10-08 DIAGNOSIS — J01.91 ACUTE RECURRENT SINUSITIS, UNSPECIFIED LOCATION: Primary | ICD-10-CM

## 2018-10-08 PROCEDURE — 99213 OFFICE O/P EST LOW 20 MIN: CPT | Performed by: PHYSICIAN ASSISTANT

## 2018-10-08 PROCEDURE — 1036F TOBACCO NON-USER: CPT | Performed by: PHYSICIAN ASSISTANT

## 2018-10-08 RX ORDER — FLUTICASONE PROPIONATE 50 MCG
2 SPRAY, SUSPENSION (ML) NASAL DAILY
Qty: 16 G | Refills: 0 | Status: SHIPPED | OUTPATIENT
Start: 2018-10-08 | End: 2018-11-04 | Stop reason: SDUPTHER

## 2018-10-08 RX ORDER — CEFUROXIME AXETIL 500 MG/1
500 TABLET ORAL EVERY 12 HOURS SCHEDULED
Qty: 20 TABLET | Refills: 0 | Status: SHIPPED | OUTPATIENT
Start: 2018-10-08 | End: 2018-10-18

## 2018-10-08 NOTE — PROGRESS NOTES
Assessment/Plan:      Diagnoses and all orders for this visit:    Acute recurrent sinusitis, unspecified location  -     cefuroxime (CEFTIN) 500 mg tablet; Take 1 tablet (500 mg total) by mouth every 12 (twelve) hours for 10 days  -     fluticasone (FLONASE) 50 mcg/act nasal spray; 2 sprays into each nostril daily    Nausea        70-year-old male presenting today for follow-up to sinusitis  No better on azithromycin he was treated with on 09/28  He does have some nausea and an episode of dizziness with moving quick earlier today  There are no other accompanying GI symptoms to consider GI virus  I will treat him with a different antibiotic, Ceftin b i d  Times 10 days  He will continue Mucinex and Tylenol and I will also add fluticasone nasal spray which should hopefully help with nasal congestion and sinus pressure  Will see how he does through the course of the week and he is to call or follow up with symptoms  Chief Complaint   Patient presents with    Nausea     x today        Subjective:     Patient ID: Daniel Wood is a 62 y o  male     59y/o male here today for acute nausea today  Also congested and sinus pressure with some dizziness past week, was given zpack and doesn't feel better  He takes tylenol, mucinex  Denies abdominal pain  No vomiting  Decreased appetite  No diarrhea  No fevers  He sttaes sugars have been fine at home, 161 earlier today  Review of Systems   Constitutional: Positive for chills, diaphoresis and fatigue  Negative for fever  HENT: Positive for congestion and sinus pressure  Negative for sore throat  Respiratory: Negative  Cardiovascular: Negative  Gastrointestinal:        As in HPI   Neurological: Positive for dizziness  Psychiatric/Behavioral: Negative            The following portions of the patient's history were reviewed and updated as appropriate: allergies, current medications, past family history, past medical history, past social history, past surgical history and problem list       Objective:     Physical Exam   Constitutional: He is oriented to person, place, and time  He appears well-developed and well-nourished  HENT:   Head: Normocephalic  Nose: Mucosal edema and rhinorrhea present  Mouth/Throat: Oropharynx is clear and moist    Cerumen B/l with narrow ear canals  TM's not directly visualized  Neck: Neck supple  Cardiovascular: Normal rate, regular rhythm and normal heart sounds  Pulmonary/Chest: Effort normal and breath sounds normal    Lymphadenopathy:     He has no cervical adenopathy  Neurological: He is alert and oriented to person, place, and time  Psychiatric: He has a normal mood and affect  Vitals reviewed        Vitals:    10/08/18 1531   BP: 142/76   Pulse: 79   Resp: 16   Temp: 97 9 °F (36 6 °C)   TempSrc: Tympanic   SpO2: 98%   Weight: 70 8 kg (156 lb 1 6 oz)   Height: 6' (1 829 m)

## 2018-10-15 DIAGNOSIS — E11.65 UNCONTROLLED TYPE 2 DIABETES MELLITUS WITH HYPERGLYCEMIA (HCC): Primary | ICD-10-CM

## 2018-10-15 RX ORDER — INSULIN GLARGINE 100 [IU]/ML
INJECTION, SOLUTION SUBCUTANEOUS
Qty: 75 ML | Refills: 5 | Status: SHIPPED | OUTPATIENT
Start: 2018-10-15 | End: 2020-05-12 | Stop reason: SDUPTHER

## 2018-11-04 DIAGNOSIS — J01.91 ACUTE RECURRENT SINUSITIS, UNSPECIFIED LOCATION: ICD-10-CM

## 2018-11-05 RX ORDER — FLUTICASONE PROPIONATE 50 MCG
SPRAY, SUSPENSION (ML) NASAL
Qty: 1 BOTTLE | Refills: 1 | Status: SHIPPED | OUTPATIENT
Start: 2018-11-05 | End: 2020-01-13

## 2019-02-26 DIAGNOSIS — Z79.4 TYPE 2 DIABETES MELLITUS WITH HYPERGLYCEMIA, WITH LONG-TERM CURRENT USE OF INSULIN (HCC): Primary | ICD-10-CM

## 2019-02-26 DIAGNOSIS — E11.65 TYPE 2 DIABETES MELLITUS WITH HYPERGLYCEMIA, WITH LONG-TERM CURRENT USE OF INSULIN (HCC): Primary | ICD-10-CM

## 2019-02-26 RX ORDER — INSULIN LISPRO 100 [IU]/ML
INJECTION, SOLUTION INTRAVENOUS; SUBCUTANEOUS
Qty: 15 ML | Refills: 3 | Status: SHIPPED | OUTPATIENT
Start: 2019-02-26 | End: 2022-07-18 | Stop reason: SDUPTHER

## 2019-05-27 DIAGNOSIS — E78.2 MIXED HYPERLIPIDEMIA: ICD-10-CM

## 2019-05-29 RX ORDER — SIMVASTATIN 40 MG
TABLET ORAL
Qty: 90 TABLET | Refills: 0 | Status: SHIPPED | OUTPATIENT
Start: 2019-05-29 | End: 2019-12-13 | Stop reason: SDUPTHER

## 2019-08-27 DIAGNOSIS — E78.2 MIXED HYPERLIPIDEMIA: ICD-10-CM

## 2019-08-27 NOTE — TELEPHONE ENCOUNTER
Call patient - have him schedule an appt to be seen and ask him where he will have his blood work before appt - I will send enough Simvastatin to last until his appt, so let me know when it is made

## 2019-09-17 RX ORDER — SIMVASTATIN 40 MG
TABLET ORAL
Qty: 90 TABLET | Refills: 4 | OUTPATIENT
Start: 2019-09-17

## 2019-11-05 DIAGNOSIS — E11.65 UNCONTROLLED TYPE 2 DIABETES MELLITUS WITH HYPERGLYCEMIA (HCC): ICD-10-CM

## 2019-11-08 RX ORDER — INSULIN GLARGINE 100 [IU]/ML
INJECTION, SOLUTION SUBCUTANEOUS
Qty: 75 ML | Refills: 4 | OUTPATIENT
Start: 2019-11-08

## 2019-11-08 NOTE — TELEPHONE ENCOUNTER
Patient has not been seen in over a year  He needs to schedule a follow-up/reestablish care with a new provider  One scheduled I will get a 30 day supply  No other refills will be given until he is seen

## 2019-11-18 ENCOUNTER — TELEPHONE (OUTPATIENT)
Dept: FAMILY MEDICINE CLINIC | Facility: CLINIC | Age: 59
End: 2019-11-18

## 2019-12-09 ENCOUNTER — TELEPHONE (OUTPATIENT)
Dept: FAMILY MEDICINE CLINIC | Facility: CLINIC | Age: 59
End: 2019-12-09

## 2019-12-13 ENCOUNTER — OFFICE VISIT (OUTPATIENT)
Dept: FAMILY MEDICINE CLINIC | Facility: CLINIC | Age: 59
End: 2019-12-13
Payer: COMMERCIAL

## 2019-12-13 VITALS
OXYGEN SATURATION: 99 % | HEIGHT: 72 IN | SYSTOLIC BLOOD PRESSURE: 136 MMHG | HEART RATE: 74 BPM | WEIGHT: 157.2 LBS | BODY MASS INDEX: 21.29 KG/M2 | DIASTOLIC BLOOD PRESSURE: 72 MMHG | TEMPERATURE: 96.3 F

## 2019-12-13 DIAGNOSIS — E78.2 MIXED HYPERLIPIDEMIA: ICD-10-CM

## 2019-12-13 DIAGNOSIS — Z79.4 TYPE 2 DIABETES MELLITUS WITHOUT COMPLICATION, WITH LONG-TERM CURRENT USE OF INSULIN (HCC): ICD-10-CM

## 2019-12-13 DIAGNOSIS — E11.9 TYPE 2 DIABETES MELLITUS WITHOUT COMPLICATION, WITH LONG-TERM CURRENT USE OF INSULIN (HCC): ICD-10-CM

## 2019-12-13 DIAGNOSIS — E11.65 TYPE 2 DIABETES MELLITUS WITH HYPERGLYCEMIA, WITH LONG-TERM CURRENT USE OF INSULIN (HCC): Primary | ICD-10-CM

## 2019-12-13 DIAGNOSIS — Z23 NEED FOR VACCINATION: ICD-10-CM

## 2019-12-13 DIAGNOSIS — F10.10 ALCOHOL ABUSE: ICD-10-CM

## 2019-12-13 DIAGNOSIS — I10 BENIGN ESSENTIAL HYPERTENSION: ICD-10-CM

## 2019-12-13 DIAGNOSIS — Z79.4 TYPE 2 DIABETES MELLITUS WITH HYPERGLYCEMIA, WITH LONG-TERM CURRENT USE OF INSULIN (HCC): Primary | ICD-10-CM

## 2019-12-13 DIAGNOSIS — Z23 NEED FOR INFLUENZA VACCINATION: ICD-10-CM

## 2019-12-13 LAB — SL AMB POCT HEMOGLOBIN AIC: 10 (ref ?–6.5)

## 2019-12-13 PROCEDURE — 4010F ACE/ARB THERAPY RXD/TAKEN: CPT | Performed by: FAMILY MEDICINE

## 2019-12-13 PROCEDURE — 90682 RIV4 VACC RECOMBINANT DNA IM: CPT | Performed by: FAMILY MEDICINE

## 2019-12-13 PROCEDURE — 90471 IMMUNIZATION ADMIN: CPT | Performed by: FAMILY MEDICINE

## 2019-12-13 PROCEDURE — 3046F HEMOGLOBIN A1C LEVEL >9.0%: CPT | Performed by: FAMILY MEDICINE

## 2019-12-13 PROCEDURE — 99214 OFFICE O/P EST MOD 30 MIN: CPT | Performed by: FAMILY MEDICINE

## 2019-12-13 PROCEDURE — 1036F TOBACCO NON-USER: CPT | Performed by: FAMILY MEDICINE

## 2019-12-13 PROCEDURE — 83036 HEMOGLOBIN GLYCOSYLATED A1C: CPT | Performed by: FAMILY MEDICINE

## 2019-12-13 PROCEDURE — 3078F DIAST BP <80 MM HG: CPT | Performed by: FAMILY MEDICINE

## 2019-12-13 PROCEDURE — 3008F BODY MASS INDEX DOCD: CPT | Performed by: FAMILY MEDICINE

## 2019-12-13 PROCEDURE — 3075F SYST BP GE 130 - 139MM HG: CPT | Performed by: FAMILY MEDICINE

## 2019-12-13 RX ORDER — SIMVASTATIN 40 MG
40 TABLET ORAL DAILY
Qty: 90 TABLET | Refills: 1 | Status: SHIPPED | OUTPATIENT
Start: 2019-12-13 | End: 2020-06-10

## 2019-12-13 RX ORDER — LISINOPRIL 2.5 MG/1
2.5 TABLET ORAL DAILY
Qty: 90 TABLET | Refills: 1 | Status: SHIPPED | OUTPATIENT
Start: 2019-12-13 | End: 2020-06-10

## 2019-12-13 NOTE — ASSESSMENT & PLAN NOTE
Lab Results   Component Value Date    HGBA1C 10 0 (A) 12/13/2019     [facility]  He is recently retired  He has not been compliant with his medications of late  He also drinks 6 beers per day  He currently takes Lantus 30 units daily along with Humalog (carb counting )  Patient averages 45 units with meals  He was previously on metformin but ran out of this  Rapid A1c today 10 0%  I had a long discussion with patient regarding his disease process and compliance with his regimen  Will restart metformin 500 b i d     Will continue Humalog with meals  Will increase Lantus by 1 unit daily until his morning sugars are below 140  Will refer him to Endocrinology  Will check fasting blood work tomorrow    Will call with results     I would like to see him back in 1 month

## 2019-12-13 NOTE — PROGRESS NOTES
[facility]      NAME: Kate Funes  AGE: 61 y o  SEX: male  : 1960   MRN: [id number]    DATE: 2019  TIME: 12:40 PM    Assessment and Plan     Problem List Items Addressed This Visit     Benign essential hypertension      Will restart lisinopril 2 5 mg daily  Will recheck in 1 month         Relevant Medications    lisinopril (ZESTRIL) 2 5 mg tablet    Other Relevant Orders    CBC and differential    TSH, 3rd generation with Free T4 reflex    Mixed hyperlipidemia      Patient ran out of simvastatin 40  Will restart this med  Will check lipids tomorrow  Relevant Medications    simvastatin (ZOCOR) 40 mg tablet    Other Relevant Orders    Lipid Panel with Direct LDL reflex    Type 2 diabetes mellitus with hyperglycemia ([facility] ) - Primary       Lab Results   Component Value Date    HGBA1C 10 0 (A) 2019     [facility]  He is recently retired  He has not been compliant with his medications of late  He also drinks 6 beers per day  He currently takes Lantus 30 units daily along with Humalog (carb counting )  Patient averages 45 units with meals  He was previously on metformin but ran out of this  Rapid A1c today 10 0%  I had a long discussion with patient regarding his disease process and compliance with his regimen  Will restart metformin 500 b i d     Will continue Humalog with meals  Will increase Lantus by 1 unit daily until his morning sugars are below 140  Will refer him to Endocrinology  Will check fasting blood work tomorrow    Will call with results     I would like to see him back in 1 month         Relevant Medications    metFORMIN (GLUCOPHAGE) 500 mg tablet    Other Relevant Orders    POCT hemoglobin A1c (Completed)    Comprehensive metabolic panel    TSH, 3rd generation with Free T4 reflex    Microalbumin / creatinine urine ratio    Ambulatory referral to Endocrinology    Alcohol abuse      Patient drinks approximately 6 beers every night  He recently switched to [employer]  I had a long discussion with patient regarding his drinking  Refuses counseling at this time  He is a agreeable to attempt to cut his drinking and half by next visit  Will continue to monitor  Will check labs in near future           Other Visit Diagnoses     Need for influenza vaccination        Relevant Orders    influenza vaccine, [id number], quadrivalent, recombinant, PF, 0 5 mL, for patients 18 yr+ (FLUBLOK) (Completed)    Need for vaccination        Relevant Orders    PSA, Total Screen    Type 2 diabetes mellitus without complication, with long-term current use of insulin (HCC)        Relevant Medications    metFORMIN (GLUCOPHAGE) 500 mg tablet    lisinopril (ZESTRIL) 2 5 mg tablet           Return to office in: flu shot today  Check FBW tomorrow  Will call  1 month    Chief Complaint     Chief Complaint   Patient presents with    Follow-up     overdue for yearly follow up for DM2       History of Present Illness       Patient presents to reestablish care  He is now retired  He has not been compliant with his medications or treatment regimen  He currently takes Lantus 30 units daily along with Humalog, 45 units with meals  He ran out of metformin, simvastatin, lisinopril has not been taking these  He drinks approximately 6 beers nightly  The following portions of the patient's history were reviewed and updated as appropriate: allergies, current medications, past family history, past medical history, past social history, past surgical history and problem list     Review of Systems   Review of Systems   Respiratory: Negative  Cardiovascular: Negative  Gastrointestinal: Negative  Genitourinary: Negative          Active Problem List     Patient Active Problem List   Diagnosis    Benign essential hypertension    Mixed hyperlipidemia    Type 2 diabetes mellitus with hyperglycemia (HCC)    Alcohol abuse       Objective   /72 (BP Location: Right arm, Patient Position: Sitting, Cuff Size: Adult)   Pulse 74   Temp (!) 96 3 °F (35 7 °C)   Ht 5' 11 65" (1 82 m)   Wt 71 3 kg (157 lb 3 2 oz)   SpO2 99%   BMI 21 53 kg/m²   Right Foot/Ankle   Right Foot Inspection  Skin Exam: skin normal and skin intact no dry skin, no warmth, no callus, no erythema, no maceration, no abnormal color, no pre-ulcer, no ulcer and no callus                          Toe Exam: ROM and strength within normal limits  Sensory       Monofilament testing: intact  Vascular  Capillary refills: < 3 seconds  The right DP pulse is 2+  The right PT pulse is 2+  Left Foot/Ankle  Left Foot Inspection  Skin Exam: skin normal and skin intactno dry skin, no warmth, no erythema, no maceration, normal color, no pre-ulcer, no ulcer and no callus                         Toe Exam: ROM and strength within normal limits                   Sensory       Monofilament: intact  Vascular  Capillary refills: < 3 seconds  The left DP pulse is 2+  The left PT pulse is 2+  Assign Risk Category:  No deformity present; No loss of protective sensation; No weak pulses       Risk: 0    Physical Exam   Cardiovascular: Normal rate, regular rhythm, normal heart sounds and intact distal pulses  Pulses are no weak pulses  Pulses:       Dorsalis pedis pulses are 2+ on the right side, and 2+ on the left side  Posterior tibial pulses are 2+ on the right side, and 2+ on the left side  Carotids: no bruits  Ext: no edema   Pulmonary/Chest: Effort normal  No respiratory distress  He has no wheezes  He has no rales  Feet:   Right Foot:   Skin Integrity: Negative for ulcer, skin breakdown, erythema, warmth, callus or dry skin  Left Foot:   Skin Integrity: Negative for ulcer, skin breakdown, erythema, warmth, callus or dry skin  Psychiatric: He has a normal mood and affect   His behavior is normal  Thought content normal        Pertinent Laboratory/Diagnostic Studies:    Rapid A1c    Current Medications     Current Outpatient Medications:     aspirin 81 MG tablet, Take 81 mg by mouth daily  , Disp: , Rfl:     HUMALOG KWIKPEN 100 units/mL injection pen, INJECT 5 UNITS UNDER THE SKIN BASED ON CARBOHYDRATE COUNTING AFTER MEALS, Disp: 15 mL, Rfl: 3    LANTUS SOLOSTAR 100 units/mL injection pen, INJECT 50 UNITS IN THE MORNING, Disp: 75 mL, Rfl: 5    fluticasone (FLONASE) 50 mcg/act nasal spray, SPRAY 2 SPRAYS INTO EACH NOSTRIL EVERY DAY (Patient not taking: Reported on 12/13/2019), Disp: 1 Bottle, Rfl: 1    glucose blood (ONE TOUCH ULTRA TEST) test strip, Test blood sugars 3 times a day, Disp: 100 each, Rfl: 2    lisinopril (ZESTRIL) 2 5 mg tablet, Take 1 tablet (2 5 mg total) by mouth daily, Disp: 90 tablet, Rfl: 1    metFORMIN (GLUCOPHAGE) 500 mg tablet, Take 1 tablet (500 mg total) by mouth 2 (two) times a day with meals, Disp: 180 tablet, Rfl: 1    simvastatin (ZOCOR) 40 mg tablet, Take 1 tablet (40 mg total) by mouth daily, Disp: 90 tablet, Rfl: 1    Health Maintenance     Health Maintenance   Topic Date Due    CRC Screening: Colonoscopy  1960    Hepatitis B Vaccine (1 of 3 - Risk 3-dose series) 10/11/1979    HEMOGLOBIN A1C  11/24/2018    Influenza Vaccine  07/01/2019    DM Eye Exam  09/24/2019    Diabetic Foot Exam  09/27/2019    Depression Screening PHQ  12/13/2020    BMI: Adult  12/13/2020    DTaP,Tdap,and Td Vaccines (2 - Td) 06/25/2023    Pneumococcal Vaccine: 65+ Years (1 of 2 - PCV13) 10/11/2025    Pneumococcal Vaccine: Pediatrics (0 to 5 Years) and At-Risk Patients (6 to 59 Years)  Completed    HIV Screening  Addressed    Hepatitis C Screening  Addressed    HIB Vaccine  Aged Out    IPV Vaccine  Aged Out    Hepatitis A Vaccine  Aged Out    Meningococcal ACWY Vaccine  Aged Out    HPV Vaccine  Aged Out     Immunization History   Administered Date(s) Administered    Influenza Quadrivalent, 6-35 Months IM 10/04/2017    Influenza TIV (IM) 12/13/2012    Influenza, recombinant, quadrivalent,injectable, preservative free 09/24/2018, 12/13/2019    Pneumococcal Polysaccharide PPV23 12/13/2012    Tdap 06/25/2013       Viceky Taylor DO  [facility]

## 2019-12-13 NOTE — PATIENT INSTRUCTIONS
1) check sugars 2x daily:  morning fasting and 2 hours after supper   2) increase Lantus dosage by 1 unit daily until morning sugar is consistantly below 140  3) get your fasting blood work done tomorrow  4) will refer to endocrine  5) recheck in 1 month in our office

## 2019-12-13 NOTE — ASSESSMENT & PLAN NOTE
Patient drinks approximately 6 beers every night  He recently switched to [employer]  I had a long discussion with patient regarding his drinking  Refuses counseling at this time  He is a agreeable to attempt to cut his drinking and half by next visit  Will continue to monitor    Will check labs in near future

## 2019-12-14 ENCOUNTER — APPOINTMENT (OUTPATIENT)
Dept: LAB | Facility: CLINIC | Age: 59
End: 2019-12-14
Payer: COMMERCIAL

## 2019-12-14 DIAGNOSIS — Z23 NEED FOR VACCINATION: ICD-10-CM

## 2019-12-14 DIAGNOSIS — E11.65 TYPE 2 DIABETES MELLITUS WITH HYPERGLYCEMIA, WITH LONG-TERM CURRENT USE OF INSULIN (HCC): ICD-10-CM

## 2019-12-14 DIAGNOSIS — E78.2 MIXED HYPERLIPIDEMIA: ICD-10-CM

## 2019-12-14 DIAGNOSIS — Z79.4 TYPE 2 DIABETES MELLITUS WITH HYPERGLYCEMIA, WITH LONG-TERM CURRENT USE OF INSULIN (HCC): ICD-10-CM

## 2019-12-14 DIAGNOSIS — I10 BENIGN ESSENTIAL HYPERTENSION: ICD-10-CM

## 2019-12-14 LAB
ALBUMIN SERPL BCP-MCNC: 3.8 G/DL (ref 3.5–5)
ALP SERPL-CCNC: 87 U/L (ref 46–116)
ALT SERPL W P-5'-P-CCNC: 24 U/L (ref 12–78)
ANION GAP SERPL CALCULATED.3IONS-SCNC: 1 MMOL/L (ref 4–13)
AST SERPL W P-5'-P-CCNC: 10 U/L (ref 5–45)
BASOPHILS # BLD AUTO: 0.06 THOUSANDS/ΜL (ref 0–0.1)
BASOPHILS NFR BLD AUTO: 1 % (ref 0–1)
BILIRUB SERPL-MCNC: 2.02 MG/DL (ref 0.2–1)
BUN SERPL-MCNC: 10 MG/DL (ref 5–25)
CALCIUM SERPL-MCNC: 9.1 MG/DL (ref 8.3–10.1)
CHLORIDE SERPL-SCNC: 105 MMOL/L (ref 100–108)
CHOLEST SERPL-MCNC: 187 MG/DL (ref 50–200)
CO2 SERPL-SCNC: 32 MMOL/L (ref 21–32)
CREAT SERPL-MCNC: 0.67 MG/DL (ref 0.6–1.3)
CREAT UR-MCNC: 191 MG/DL
EOSINOPHIL # BLD AUTO: 0.24 THOUSAND/ΜL (ref 0–0.61)
EOSINOPHIL NFR BLD AUTO: 4 % (ref 0–6)
ERYTHROCYTE [DISTWIDTH] IN BLOOD BY AUTOMATED COUNT: 12.8 % (ref 11.6–15.1)
GFR SERPL CREATININE-BSD FRML MDRD: 105 ML/MIN/1.73SQ M
GLUCOSE P FAST SERPL-MCNC: 139 MG/DL (ref 65–99)
HCT VFR BLD AUTO: 46.1 % (ref 36.5–49.3)
HDLC SERPL-MCNC: 56 MG/DL
HGB BLD-MCNC: 14.6 G/DL (ref 12–17)
IMM GRANULOCYTES # BLD AUTO: 0.01 THOUSAND/UL (ref 0–0.2)
IMM GRANULOCYTES NFR BLD AUTO: 0 % (ref 0–2)
LDLC SERPL CALC-MCNC: 114 MG/DL (ref 0–100)
LYMPHOCYTES # BLD AUTO: 1.8 THOUSANDS/ΜL (ref 0.6–4.47)
LYMPHOCYTES NFR BLD AUTO: 31 % (ref 14–44)
MCH RBC QN AUTO: 28.9 PG (ref 26.8–34.3)
MCHC RBC AUTO-ENTMCNC: 31.7 G/DL (ref 31.4–37.4)
MCV RBC AUTO: 91 FL (ref 82–98)
MICROALBUMIN UR-MCNC: 16.8 MG/L (ref 0–20)
MICROALBUMIN/CREAT 24H UR: 9 MG/G CREATININE (ref 0–30)
MONOCYTES # BLD AUTO: 0.65 THOUSAND/ΜL (ref 0.17–1.22)
MONOCYTES NFR BLD AUTO: 11 % (ref 4–12)
NEUTROPHILS # BLD AUTO: 3.14 THOUSANDS/ΜL (ref 1.85–7.62)
NEUTS SEG NFR BLD AUTO: 53 % (ref 43–75)
NRBC BLD AUTO-RTO: 0 /100 WBCS
PLATELET # BLD AUTO: 273 THOUSANDS/UL (ref 149–390)
PMV BLD AUTO: 10.4 FL (ref 8.9–12.7)
POTASSIUM SERPL-SCNC: 4.2 MMOL/L (ref 3.5–5.3)
PROT SERPL-MCNC: 7 G/DL (ref 6.4–8.2)
PSA SERPL-MCNC: 0.7 NG/ML (ref 0–4)
RBC # BLD AUTO: 5.06 MILLION/UL (ref 3.88–5.62)
SODIUM SERPL-SCNC: 138 MMOL/L (ref 136–145)
TRIGL SERPL-MCNC: 84 MG/DL
TSH SERPL DL<=0.05 MIU/L-ACNC: 0.96 UIU/ML (ref 0.36–3.74)
WBC # BLD AUTO: 5.9 THOUSAND/UL (ref 4.31–10.16)

## 2019-12-14 PROCEDURE — 82570 ASSAY OF URINE CREATININE: CPT

## 2019-12-14 PROCEDURE — G0103 PSA SCREENING: HCPCS

## 2019-12-14 PROCEDURE — 85025 COMPLETE CBC W/AUTO DIFF WBC: CPT

## 2019-12-14 PROCEDURE — 3061F NEG MICROALBUMINURIA REV: CPT | Performed by: FAMILY MEDICINE

## 2019-12-14 PROCEDURE — 80061 LIPID PANEL: CPT

## 2019-12-14 PROCEDURE — 82043 UR ALBUMIN QUANTITATIVE: CPT

## 2019-12-14 PROCEDURE — 80053 COMPREHEN METABOLIC PANEL: CPT

## 2019-12-14 PROCEDURE — 36415 COLL VENOUS BLD VENIPUNCTURE: CPT

## 2019-12-14 PROCEDURE — 84443 ASSAY THYROID STIM HORMONE: CPT

## 2020-01-13 ENCOUNTER — OFFICE VISIT (OUTPATIENT)
Dept: FAMILY MEDICINE CLINIC | Facility: CLINIC | Age: 60
End: 2020-01-13
Payer: COMMERCIAL

## 2020-01-13 VITALS
HEART RATE: 80 BPM | TEMPERATURE: 98 F | SYSTOLIC BLOOD PRESSURE: 124 MMHG | RESPIRATION RATE: 16 BRPM | WEIGHT: 159 LBS | OXYGEN SATURATION: 98 % | DIASTOLIC BLOOD PRESSURE: 74 MMHG | BODY MASS INDEX: 21.54 KG/M2 | HEIGHT: 72 IN

## 2020-01-13 DIAGNOSIS — E78.2 MIXED HYPERLIPIDEMIA: ICD-10-CM

## 2020-01-13 DIAGNOSIS — E11.65 TYPE 2 DIABETES MELLITUS WITH HYPERGLYCEMIA, UNSPECIFIED WHETHER LONG TERM INSULIN USE (HCC): Primary | ICD-10-CM

## 2020-01-13 DIAGNOSIS — K21.9 GASTROESOPHAGEAL REFLUX DISEASE WITHOUT ESOPHAGITIS: ICD-10-CM

## 2020-01-13 DIAGNOSIS — I10 BENIGN ESSENTIAL HYPERTENSION: ICD-10-CM

## 2020-01-13 DIAGNOSIS — F10.10 ALCOHOL ABUSE: ICD-10-CM

## 2020-01-13 DIAGNOSIS — R79.89 ELEVATED LIVER FUNCTION TESTS: ICD-10-CM

## 2020-01-13 PROCEDURE — 3078F DIAST BP <80 MM HG: CPT | Performed by: FAMILY MEDICINE

## 2020-01-13 PROCEDURE — 3074F SYST BP LT 130 MM HG: CPT | Performed by: FAMILY MEDICINE

## 2020-01-13 PROCEDURE — 99214 OFFICE O/P EST MOD 30 MIN: CPT | Performed by: FAMILY MEDICINE

## 2020-01-13 PROCEDURE — 3008F BODY MASS INDEX DOCD: CPT | Performed by: FAMILY MEDICINE

## 2020-01-13 RX ORDER — OMEPRAZOLE 20 MG/1
20 CAPSULE, DELAYED RELEASE ORAL DAILY
Qty: 30 CAPSULE | Refills: 2 | Status: SHIPPED | OUTPATIENT
Start: 2020-01-13 | End: 2021-11-12 | Stop reason: SDDI

## 2020-01-13 NOTE — ASSESSMENT & PLAN NOTE
Patient complains of reflux symptoms recently  I told him they are most likely related to his alcohol consumption  Will start omeprazole 20 mg daily for now    Possible need for upper GI or EGD

## 2020-01-13 NOTE — PROGRESS NOTES
[facility]      NAME: Junior Naylor  AGE: 61 y o  SEX: male  : 1960   MRN: [id number]    DATE: 2020  TIME: 5:50 PM    Assessment and Plan     Problem List Items Addressed This Visit     Benign essential hypertension       Reasonably controlled since restarting lisinopril 2 5 mg daily  Will continue to monitor         Mixed hyperlipidemia      Cholesterol from  was 187/114  Patient has restarted simvastatin 40 mg daily  Will continue to monitor         [facility] - Primary       Lab Results   Component Value Date    HGBA1C 10 0 (A) 2019    rapid A1c at last visit was 10 0 ( on 2019)  Since that time, patient has increased his Lantus dosage to 40 units daily  He also restarted metformin 500 mg daily (I asked him to increase to b i d )  He also still uses Humalog, but only a few units with meals  Denies any hypoglycemia  Patient has appointment with Endocrinology on   Will recheck labs in 3 months ( CMP and A1c followed by appointment)  Relevant Orders    Hemoglobin A1C    Comprehensive metabolic panel    Alcohol abuse      Patient recently switched to drinking light beer  He still drinks approximately a six-pack per day  Bilirubin level from  was 2 02  Remainder of LFTs were unremarkable  Will sent for ultrasound of liver  Counseled regarding alcohol cessation  Offered to refer to alcohol counselor  He declines at this time but will continue to work on decreasing his alcohol consumption  Gastroesophageal reflux disease without esophagitis      Patient complains of reflux symptoms recently  I told him they are most likely related to his alcohol consumption  Will start omeprazole 20 mg daily for now    Possible need for upper GI or EGD         Relevant Medications    omeprazole (PriLOSEC) 20 mg delayed release capsule      Other Visit Diagnoses     Elevated liver function tests        Relevant Orders    US liver              Return to office in:  3 months, fasting blood work prior (cmp/a1c)    NOTE:  Patient is due for colonoscopy  Will hold off on order in till diabetic control has improved    Chief Complaint     Chief Complaint   Patient presents with    Follow-up     4 weeks       History of Present Illness      Patient presents for recheck  From last month's appointment  He got labs done on December 14th  Overall he feels well  Since last visit, he restarted his blood pressure medication, lisinopril  Restarted his cholesterol medication, simvastatin  He has started to cut down on his drinking  The following portions of the patient's history were reviewed and updated as appropriate: allergies, current medications, past family history, past medical history, past social history, past surgical history and problem list     Review of Systems   Review of Systems   Respiratory: Negative  Cardiovascular: Negative  Gastrointestinal: Negative  Genitourinary: Negative  Active Problem List     Patient Active Problem List   Diagnosis    Benign essential hypertension    Mixed hyperlipidemia    Type 2 diabetes mellitus with hyperglycemia (HCC)    Alcohol abuse    Gastroesophageal reflux disease without esophagitis       Objective   /74 (BP Location: Left arm, Patient Position: Sitting, Cuff Size: Adult)   Pulse 80   Temp 98 °F (36 7 °C) (Tympanic)   Resp 16   Ht 5' 11 65" (1 82 m)   Wt 72 1 kg (159 lb)   SpO2 98%   BMI 21 77 kg/m²     Physical Exam   Cardiovascular: Normal rate, regular rhythm, normal heart sounds and intact distal pulses  Carotids: no bruits  Ext: no edema   Pulmonary/Chest: Effort normal  No respiratory distress  He has no wheezes  He has no rales  Psychiatric: He has a normal mood and affect   His behavior is normal  Thought content normal        Pertinent Laboratory/Diagnostic Studies:   labs from December 14th reviewed    Current Medications     Current Outpatient Medications:     aspirin 81 MG tablet, Take 81 mg by mouth daily  , Disp: , Rfl:     glucose blood (ONE TOUCH ULTRA TEST) test strip, Test blood sugars 3 times a day, Disp: 100 each, Rfl: 2    HUMALOG KWIKPEN 100 units/mL injection pen, INJECT 5 UNITS UNDER THE SKIN BASED ON CARBOHYDRATE COUNTING AFTER MEALS, Disp: 15 mL, Rfl: 3    LANTUS SOLOSTAR 100 units/mL injection pen, INJECT 50 UNITS IN THE MORNING, Disp: 75 mL, Rfl: 5    lisinopril (ZESTRIL) 2 5 mg tablet, Take 1 tablet (2 5 mg total) by mouth daily, Disp: 90 tablet, Rfl: 1    metFORMIN (GLUCOPHAGE) 500 mg tablet, Take 1 tablet (500 mg total) by mouth 2 (two) times a day with meals, Disp: 180 tablet, Rfl: 1    simvastatin (ZOCOR) 40 mg tablet, Take 1 tablet (40 mg total) by mouth daily, Disp: 90 tablet, Rfl: 1    omeprazole (PriLOSEC) 20 mg delayed release capsule, Take 1 capsule (20 mg total) by mouth daily, Disp: 30 capsule, Rfl: 2    Health Maintenance     Health Maintenance   Topic Date Due    CRC Screening: Colonoscopy  1960    Hepatitis B Vaccine (1 of 3 - Risk 3-dose series) 10/11/1979    DM Eye Exam  09/24/2019    HEMOGLOBIN A1C  06/13/2020    Depression Screening PHQ  12/13/2020    BMI: Adult  12/13/2020    Diabetic Foot Exam  12/13/2020    DTaP,Tdap,and Td Vaccines (2 - Td) 06/25/2023    Pneumococcal Vaccine: 65+ Years (1 of 2 - PCV13) 10/11/2025    Pneumococcal Vaccine: Pediatrics (0 to 5 Years) and At-Risk Patients (6 to 59 Years)  Completed    Influenza Vaccine  Completed    HIV Screening  Addressed    Hepatitis C Screening  Addressed    HIB Vaccine  Aged Out    IPV Vaccine  Aged Out    Hepatitis A Vaccine  Aged Out    Meningococcal ACWY Vaccine  Aged Out    HPV Vaccine  Aged Out     Immunization History   Administered Date(s) Administered    Influenza Quadrivalent, 6-35 Months IM 10/04/2017    Influenza TIV (IM) 12/13/2012    Influenza, recombinant, quadrivalent,injectable, preservative free 09/24/2018, 12/13/2019    Pneumococcal Polysaccharide PPV23 12/13/2012    Tdap 06/25/2013       Debbie Decker DO  [facility]

## 2020-01-13 NOTE — ASSESSMENT & PLAN NOTE
Cholesterol from December 14th was 187/114  Patient has restarted simvastatin 40 mg daily    Will continue to monitor

## 2020-01-13 NOTE — ASSESSMENT & PLAN NOTE
Patient recently switched to drinking light beer  He still drinks approximately a six-pack per day  Bilirubin level from December 14th was 2 02  Remainder of LFTs were unremarkable  Will sent for ultrasound of liver  Counseled regarding alcohol cessation  Offered to refer to alcohol counselor  He declines at this time but will continue to work on decreasing his alcohol consumption

## 2020-01-13 NOTE — ASSESSMENT & PLAN NOTE
Lab Results   Component Value Date    HGBA1C 10 0 (A) 12/13/2019    rapid A1c at last visit was 10 0 ( on 12/13/2019)  Since that time, patient has increased his Lantus dosage to 40 units daily  He also restarted metformin 500 mg daily (I asked him to increase to b i d )  He also still uses Humalog, but only a few units with meals  Denies any hypoglycemia  Patient has appointment with Endocrinology on January 22nd  Will recheck labs in 3 months ( CMP and A1c followed by appointment)

## 2020-01-22 ENCOUNTER — CONSULT (OUTPATIENT)
Dept: ENDOCRINOLOGY | Facility: CLINIC | Age: 60
End: 2020-01-22
Payer: COMMERCIAL

## 2020-01-22 ENCOUNTER — LAB (OUTPATIENT)
Dept: LAB | Facility: CLINIC | Age: 60
End: 2020-01-22
Payer: COMMERCIAL

## 2020-01-22 VITALS
DIASTOLIC BLOOD PRESSURE: 80 MMHG | BODY MASS INDEX: 22.32 KG/M2 | HEART RATE: 68 BPM | WEIGHT: 164.8 LBS | HEIGHT: 72 IN | SYSTOLIC BLOOD PRESSURE: 120 MMHG

## 2020-01-22 DIAGNOSIS — Z79.4 TYPE 2 DIABETES MELLITUS WITH HYPERGLYCEMIA, WITH LONG-TERM CURRENT USE OF INSULIN (HCC): Primary | ICD-10-CM

## 2020-01-22 DIAGNOSIS — F10.10 ALCOHOL ABUSE: ICD-10-CM

## 2020-01-22 DIAGNOSIS — E11.65 TYPE 2 DIABETES MELLITUS WITH HYPERGLYCEMIA, WITH LONG-TERM CURRENT USE OF INSULIN (HCC): ICD-10-CM

## 2020-01-22 DIAGNOSIS — E11.65 TYPE 2 DIABETES MELLITUS WITH HYPERGLYCEMIA, WITH LONG-TERM CURRENT USE OF INSULIN (HCC): Primary | ICD-10-CM

## 2020-01-22 DIAGNOSIS — I10 BENIGN ESSENTIAL HYPERTENSION: ICD-10-CM

## 2020-01-22 DIAGNOSIS — E78.2 MIXED HYPERLIPIDEMIA: ICD-10-CM

## 2020-01-22 DIAGNOSIS — Z79.4 TYPE 2 DIABETES MELLITUS WITH HYPERGLYCEMIA, WITH LONG-TERM CURRENT USE OF INSULIN (HCC): ICD-10-CM

## 2020-01-22 PROCEDURE — 86337 INSULIN ANTIBODIES: CPT

## 2020-01-22 PROCEDURE — 36415 COLL VENOUS BLD VENIPUNCTURE: CPT

## 2020-01-22 PROCEDURE — 99204 OFFICE O/P NEW MOD 45 MIN: CPT | Performed by: INTERNAL MEDICINE

## 2020-01-22 PROCEDURE — 86341 ISLET CELL ANTIBODY: CPT

## 2020-01-22 PROCEDURE — 83519 RIA NONANTIBODY: CPT

## 2020-01-22 NOTE — PROGRESS NOTES
Eric Reyes 61 y o  male MRN: [id number]    Encounter: [id number]      Assessment/Plan     Assessment: This is a 61y o -year-old male with diabetes with hyperglycemia, hypertension and hyperlipidemia  He is diagnosed with alcohol abuse  Plan:  1  Diabetes with hyperglycemia-it is unclear if he has type 2 with type 1 diabetes  Based on his body habitus, I would mention to say that he has type 1 diabetes  In order to make a definitive diagnosis, I have ordered antibody testing  Since his blood sugars are running high, I asked him to routinely take Humalog with his meals  Additionally, his carbohydrate ratio was changed to 1 unit per 25 g of carbohydrates from 1 unit per 50 g of carbohydrates  His insulin sensitivity was changed to 1 unit per 25 mg/dL with a target of 125 mg/dL  He is to check his blood sugars 4 times a day and send them to me in two weeks so I can make further adjustments  2  Hypertension-his blood pressure is under good control  3  Hyperlipidemia-continue statin  4  Alcohol abuse-today we discussed the effect of alcohol on his blood sugars  I did ask him to decrease the amount of alcohol he uses per day  He states that he is going to try this  CC: Diabetes    History of Present Illness     HPI:  63-year-old male with diabetes for over 10 years  He is currently on basal bolus insulin therapy along with metformin  He denies any polyuria, polydipsia, nocturia and blurry vision  He denies any retinopathy, neuropathy, nephropathy, heart attack and stroke  He denies any hypoglycemia  There is no family history of diabetes  Review of Systems   Constitutional: Negative for chills and fever  Respiratory: Negative for shortness of breath  Cardiovascular: Negative for chest pain  Gastrointestinal: Negative for constipation, diarrhea, nausea and vomiting  Endocrine: Negative for polydipsia and polyuria  All other systems reviewed and are negative        Historical Information   Past Medical History:   Diagnosis Date    Alcohol abuse 12/13/2019    Diabetes mellitus ([facility] )     Gastroesophageal reflux disease without esophagitis 1/13/2020    Hypertension     Type 2 diabetes mellitus ([facility] )     with Hyperglycemia, Last assessed: 8/11/15     History reviewed  No pertinent surgical history  Social History   Social History     Substance and Sexual Activity   Alcohol Use Yes    Alcohol/week: 1 0 standard drinks    Types: 1 Cans of beer per week    Comment: 2-3 beers a night, per allscripts     Social History     Substance and Sexual Activity   Drug Use No     Social History     Tobacco Use   Smoking Status Never Smoker   Smokeless Tobacco Never Used     Family History:   Family History   Problem Relation Age of Onset    No Known Problems Mother     Stroke Father     No Known Problems Sister     No Known Problems Brother     Diabetes Paternal Aunt     Diabetes Paternal Uncle        Meds/Allergies   Current Outpatient Medications   Medication Sig Dispense Refill    aspirin 81 MG tablet Take 81 mg by mouth daily        HUMALOG KWIKPEN 100 units/mL injection pen INJECT 5 UNITS UNDER THE SKIN BASED ON CARBOHYDRATE COUNTING AFTER MEALS 15 mL 3    LANTUS SOLOSTAR 100 units/mL injection pen INJECT 50 UNITS IN THE MORNING (Patient taking differently: No sig reported) 75 mL 5    lisinopril (ZESTRIL) 2 5 mg tablet Take 1 tablet (2 5 mg total) by mouth daily 90 tablet 1    metFORMIN (GLUCOPHAGE) 500 mg tablet Take 1 tablet (500 mg total) by mouth 2 (two) times a day with meals 180 tablet 1    omeprazole (PriLOSEC) 20 mg delayed release capsule Take 1 capsule (20 mg total) by mouth daily 30 capsule 2    simvastatin (ZOCOR) 40 mg tablet Take 1 tablet (40 mg total) by mouth daily 90 tablet 1    glucose blood (ONE TOUCH ULTRA TEST) test strip Test blood sugars 3 times a day 100 each 2     No current facility-administered medications for this visit        Allergies   Allergen Reactions    Sulfamethoxazole-Trimethoprim      Annotation - [id number]: mouth swelling       Objective   Vitals: Blood pressure 120/80, pulse 68, height 6' (1 829 m), weight 74 8 kg (164 lb 12 8 oz)  Physical Exam   Constitutional: He is oriented to person, place, and time  He appears well-developed  No distress  Thin male in no apparent distress  HENT:   Head: Normocephalic and atraumatic  Mouth/Throat: Oropharynx is clear and moist and mucous membranes are normal  No oropharyngeal exudate  Eyes: Conjunctivae, EOM and lids are normal  Right eye exhibits no discharge  Left eye exhibits no discharge  No scleral icterus  Neck: Neck supple  No thyromegaly present  Cardiovascular: Normal rate, regular rhythm and normal heart sounds  Exam reveals no gallop and no friction rub  No murmur heard  Pulmonary/Chest: Effort normal and breath sounds normal  No respiratory distress  He has no wheezes  Abdominal: Soft  Bowel sounds are normal  He exhibits no distension  There is no tenderness  Musculoskeletal: Normal range of motion  He exhibits no edema, tenderness or deformity  Lymphadenopathy:        Head (right side): No occipital adenopathy present  Head (left side): No occipital adenopathy present  Right: No supraclavicular adenopathy present  Left: No supraclavicular adenopathy present  Neurological: He is alert and oriented to person, place, and time  No cranial nerve deficit  Coordination normal    Skin: Skin is warm and intact  No rash noted  He is not diaphoretic  No erythema  Psychiatric: He has a normal mood and affect  His behavior is normal    Vitals reviewed  The history was obtained from the review of the chart, patient      Lab Results:   Lab Results   Component Value Date/Time    Hemoglobin A1C 10 0 (A) 12/13/2019 08:13 AM    WBC 5 90 12/14/2019 08:22 AM    Hemoglobin 14 6 12/14/2019 08:22 AM    Hematocrit 46 1 12/14/2019 08:22 AM    MCV 91 12/14/2019 08:22 AM    Platelets 483 94/63/3516 08:22 AM    BUN 10 12/14/2019 08:22 AM    Potassium 4 2 12/14/2019 08:22 AM    Chloride 105 12/14/2019 08:22 AM    CO2 32 12/14/2019 08:22 AM    Creatinine 0 67 12/14/2019 08:22 AM    AST 10 12/14/2019 08:22 AM    ALT 24 12/14/2019 08:22 AM    Albumin 3 8 12/14/2019 08:22 AM    HDL, Direct 56 12/14/2019 08:22 AM    Triglycerides 84 12/14/2019 08:22 AM           Imaging Studies: I have personally reviewed pertinent reports  Portions of the record may have been created with voice recognition software  Occasional wrong word or "sound a like" substitutions may have occurred due to the inherent limitations of voice recognition software  Read the chart carefully and recognize, using context, where substitutions have occurred

## 2020-01-27 LAB — GAD65 AB SER-ACNC: <5 U/ML (ref 0–5)

## 2020-01-29 LAB — INSULIN AB SER-ACNC: 11 UU/ML

## 2020-01-29 NOTE — RESULT ENCOUNTER NOTE
Please call the patient regarding his abnormal result  Antibody level is positive suggesting type 1 diabetes

## 2020-01-31 ENCOUNTER — TELEPHONE (OUTPATIENT)
Dept: ENDOCRINOLOGY | Facility: CLINIC | Age: 60
End: 2020-01-31

## 2020-01-31 LAB — ISLET CELL512 AB SER-ACNC: <7.5 U/ML

## 2020-01-31 NOTE — TELEPHONE ENCOUNTER
----- Message from Phil Dalal MD sent at 1/31/2020  8:30 AM EST -----  The laboratory testing results are normal

## 2020-03-03 PROBLEM — E10.65 TYPE 1 DIABETES MELLITUS WITH HYPERGLYCEMIA (HCC): Status: ACTIVE | Noted: 2020-03-03

## 2020-03-03 NOTE — PROGRESS NOTES
Established Patient Progress Note      Chief Complaint   Patient presents with    Diabetes Type 1          History of Present Illness:   Junior Naylor is a 61 y o  male with a history of HTN, HLD, and type 1 diabetes with long term use of insulin for over 10 years  Reports no complications of diabetes  Last A1C 10 0  He did not bring in BG log or meter to today's visit  Has not been checking BG consistently as he ran out of test strips  He uses test strips from [employer]  He reports difficulty with obtaining blood for his fingersticks  He reports he mostly eats fast food and TV dinners  Denies recent illness or hospitalizations  Denies recent severe hypoglycemic or severe hyperglycemic episodes  Denies any issues with his current regimen  Home glucose monitoring: are performed sporadically    Home blood glucose readings:   Before breakfast: 80-120s  Before lunch: does not check   Before dinner: does not check   Bedtime: 200-300s    Current regimen: Lantus 22 units, Humalog 1 unit per 25 g of carbohydrates and insulin sensitivity of 1 unit per 25 mg/dL with a target of 125 mg/dL, and Metformin 500 mg BID   compliant all of the timedenies any side effects from current medications      Hypoglycemic episodes: No never   H/o of hypoglycemia causing hospitalization or Intervention such as glucagon injection or ambulance call No     Last Eye Exam: [facility]  Last Foot Exam: 12/13/19    Has hypertension: Taking Lisinopril   Has hyperlipidemia: Taking Simvastatin       Patient Active Problem List   Diagnosis    Benign essential hypertension    Mixed hyperlipidemia    [facility]    Alcohol abuse    Gastroesophageal reflux disease without esophagitis    Type 1 diabetes mellitus with hypergly[facility]      Past Medical History:   Diagnosis Date    Alcohol abuse 12/13/2019    [facility] mellitus [facility]     Gastroesophageal reflux disease without esophagitis 1/13/2020    Hypertension     Type 2 diabetes mellitus ([facility] )     with Hyperglycemia, Last assessed: 8/11/15      History reviewed  No pertinent surgical history  Family History   Problem Relation Age of Onset    No Known Problems Mother     Stroke Father     No Known Problems Sister     No Known Problems Brother     Diabetes Paternal Aunt     Diabetes Paternal Uncle      Social History     Tobacco Use    Smoking status: Never Smoker    Smokeless tobacco: Never Used   Substance Use Topics    Alcohol use: Yes     Alcohol/week: 1 0 standard drinks     Types: 1 Cans of beer per week     Comment: 2-3 beers a night, per allscripts     Allergies   Allergen Reactions    Sulfamethoxazole-Trimethoprim      [facility] - [id number]: mouth swelling         Current Outpatient Medications:     aspirin 81 MG tablet, Take 81 mg by mouth daily  , Disp: , Rfl:     glucose blood (ONE TOUCH ULTRA TEST) test strip, Test blood sugars 3 times a day, Disp: 100 each, Rfl: 2    HUMALOG KWIKPEN 100 units/mL injection pen, INJECT 5 UNITS UNDER THE SKIN BASED ON CARBOHYDRATE COUNTING AFTER MEALS, Disp: 15 mL, Rfl: 3    LANTUS SOLOSTAR 100 units/mL injection pen, INJECT 50 UNITS IN THE MORNING (Patient taking differently: No sig reported), Disp: 75 mL, Rfl: 5    lisinopril (ZESTRIL) 2 5 mg tablet, Take 1 tablet (2 5 mg total) by mouth daily, Disp: 90 tablet, Rfl: 1    metFORMIN (GLUCOPHAGE) 500 mg tablet, Take 1 tablet (500 mg total) by mouth 2 (two) times a day with meals, Disp: 180 tablet, Rfl: 1    omeprazole (PriLOSEC) 20 mg delayed release capsule, Take 1 capsule (20 mg total) by mouth daily, Disp: 30 capsule, Rfl: 2    simvastatin (ZOCOR) 40 mg tablet, Take 1 tablet (40 mg total) by mouth daily, Disp: 90 tablet, Rfl: 1    Review of Systems   Constitutional: Negative for activity change, appetite change and fatigue  HENT: Negative for sore throat, trouble swallowing and voice change      Eyes: Negative for visual disturbance  Respiratory: Negative for choking, chest tightness and shortness of breath  Cardiovascular: Negative for chest pain, palpitations and leg swelling  Gastrointestinal: Negative for abdominal pain, constipation and diarrhea  Endocrine: Negative for cold intolerance, heat intolerance, polydipsia, polyphagia and polyuria  Genitourinary: Negative for frequency  Musculoskeletal: Negative for arthralgias and myalgias  Skin: Negative for rash  Neurological: Negative for dizziness and syncope  Hematological: Negative for adenopathy  Psychiatric/Behavioral: Negative for sleep disturbance  All other systems reviewed and are negative  Physical Exam:  Body mass index is 22 19 kg/m²  /72   Pulse 75   Ht 6' (1 829 m)   Wt 74 2 kg (163 lb 9 6 oz)   BMI 22 19 kg/m²    Wt Readings from Last 3 Encounters:   03/04/20 74 2 kg (163 lb 9 6 oz)   01/22/20 74 8 kg (164 lb 12 8 oz)   01/13/20 72 1 kg (159 lb)       Physical Exam   Constitutional: He is oriented to person, place, and time  He appears well-developed and well-nourished  No distress  HENT:   Head: Normocephalic and atraumatic  Mouth/Throat: Oropharynx is clear and moist    Eyes: Pupils are equal, round, and reactive to light  Conjunctivae and EOM are normal    Neck: Normal range of motion  Neck supple  No thyromegaly present  Cardiovascular: Normal rate, regular rhythm and normal heart sounds  No murmur heard  Pulmonary/Chest: Effort normal and breath sounds normal  No respiratory distress  He has no wheezes  He has no rales  Abdominal: Soft  Bowel sounds are normal  He exhibits no distension  There is no tenderness  Musculoskeletal: Normal range of motion  He exhibits no edema  Lymphadenopathy:     He has no cervical adenopathy  Neurological: He is alert and oriented to person, place, and time  Skin: Skin is warm and dry  Psychiatric: He has a normal mood and affect  Vitals reviewed          Labs: Lab Results   Component Value Date    HGBA1C 10 0 (A) 12/13/2019     Component      Latest Ref Rng & Units 1/22/2020   IA-2 AUTOANTIBODIES      U/mL <7 5   Glutaminc Acid Decarboxylase 65 Ab      0 0 - 5 0 U/mL <5 0   INSULIN AUTOANTIBODY      uU/mL 11 (H)       Lab Results   Component Value Date    SODIUM 138 12/14/2019    K 4 2 12/14/2019     12/14/2019    CO2 32 12/14/2019    AGAP 1 (L) 12/14/2019    BUN 10 12/14/2019    CREATININE 0 67 12/14/2019    GLUC 83 05/29/2018    GLUF 139 (H) 12/14/2019    CALCIUM 9 1 12/14/2019    AST 10 12/14/2019    ALT 24 12/14/2019    ALKPHOS 87 12/14/2019    TP 7 0 12/14/2019    TBILI 2 02 (H) 12/14/2019    EGFR 105 12/14/2019         Lab Results   Component Value Date    CHOL 190 03/13/2015    HDL 56 12/14/2019    TRIG 84 12/14/2019       Lab Results   Component Value Date    [id number] 0 962 12/14/2019         Impression & Plan:    Problem List Items Addressed This Visit        Endocrine    Type 1 diabetes mellitus with hyperglycemia ([facility] ) - Primary     Uncontrolled/poorly controlled especially in evening due to dietary excursions (fast food/tv dinners)  Referral given for diabetic dietician  Stressed importance of checking BG 4x per day, he will obtain test strips from [employer] and send in BG log in two weeks for review  Discussed dexcom personal cgm to maintain better control of his BGs  He is interested in this  Will submit form after patient sends in BG log  Advised on complications of uncontrolled diabetes including; retinopathy, neuropathy, nephropathy, heart attack, and stroke            Relevant Orders    Hemoglobin A1C    Comprehensive metabolic panel    Lipid Panel with Direct LDL reflex    Microalbumin / creatinine urine ratio    Ambulatory referral to Diabetic Education       Cardiovascular and Mediastinum    Benign essential hypertension     BP stable, continue current regimen          Relevant Orders    Comprehensive metabolic panel       Other    Mixed hyperlipidemia     Continue statin, check fasting lipid panel         Relevant Orders    Lipid Panel with Direct LDL reflex          Orders Placed This Encounter   Procedures    Hemoglobin A1C    Comprehensive metabolic panel     This is a patient instruction: Patient fasting for 8 hours or longer recommended   Lipid Panel with Direct LDL reflex     This is a patient instruction: This test requires patient fasting for 10-12 hours or longer  Drinking of black coffee or black tea is acceptable   Microalbumin / creatinine urine ratio    Ambulatory referral to Diabetic Education     Standing Status:   Future     Standing Expiration Date:   3/4/2021     Referral Priority:   Routine     Referral Type:   Consult - AMB     Referral Reason:   Specialty Services Required     Requested Specialty:   Diabetes Services     Number of Visits Requested:   1     Expiration Date:   3/4/2021         Discussed with the patient and all questioned fully answered  He will call me if any problems arise  Follow-up appointment in 3 months       Counseled patient on diagnostic results, prognosis, risk and benefit of treatment options, instruction for management, importance of treatment compliance, Risk  factor reduction and impressions      Adalid Umanzor [address]

## 2020-03-04 ENCOUNTER — OFFICE VISIT (OUTPATIENT)
Dept: ENDOCRINOLOGY | Facility: CLINIC | Age: 60
End: 2020-03-04
Payer: COMMERCIAL

## 2020-03-04 VITALS
HEIGHT: 72 IN | BODY MASS INDEX: 22.16 KG/M2 | WEIGHT: 163.6 LBS | DIASTOLIC BLOOD PRESSURE: 72 MMHG | SYSTOLIC BLOOD PRESSURE: 142 MMHG | HEART RATE: 75 BPM

## 2020-03-04 DIAGNOSIS — E78.2 MIXED HYPERLIPIDEMIA: ICD-10-CM

## 2020-03-04 DIAGNOSIS — E10.65 TYPE 1 DIABETES MELLITUS WITH HYPERGLYCEMIA (HCC): Primary | ICD-10-CM

## 2020-03-04 DIAGNOSIS — I10 BENIGN ESSENTIAL HYPERTENSION: ICD-10-CM

## 2020-03-04 PROCEDURE — 99214 OFFICE O/P EST MOD 30 MIN: CPT | Performed by: NURSE PRACTITIONER

## 2020-03-04 PROCEDURE — 1036F TOBACCO NON-USER: CPT | Performed by: NURSE PRACTITIONER

## 2020-03-04 PROCEDURE — 3008F BODY MASS INDEX DOCD: CPT | Performed by: NURSE PRACTITIONER

## 2020-03-04 PROCEDURE — 3077F SYST BP >= 140 MM HG: CPT | Performed by: NURSE PRACTITIONER

## 2020-03-04 PROCEDURE — 3078F DIAST BP <80 MM HG: CPT | Performed by: NURSE PRACTITIONER

## 2020-03-04 NOTE — ASSESSMENT & PLAN NOTE
Uncontrolled/poorly controlled especially in evening due to dietary excursions (fast food/tv dinners)  Referral given for diabetic dietician  Stressed importance of checking BG 4x per day, he will obtain test strips from [employer] and send in BG log in two weeks for review  Discussed dexcom personal cgm to maintain better control of his BGs  He is interested in this  Will submit form after patient sends in BG log  Advised on complications of uncontrolled diabetes including; retinopathy, neuropathy, nephropathy, heart attack, and stroke

## 2020-03-10 ENCOUNTER — OFFICE VISIT (OUTPATIENT)
Dept: DIABETES SERVICES | Facility: CLINIC | Age: 60
End: 2020-03-10
Payer: COMMERCIAL

## 2020-03-10 VITALS — WEIGHT: 157 LBS | BODY MASS INDEX: 21.26 KG/M2 | HEIGHT: 72 IN

## 2020-03-10 DIAGNOSIS — E10.65 TYPE 1 DIABETES MELLITUS WITH HYPERGLYCEMIA (HCC): Primary | ICD-10-CM

## 2020-03-10 PROCEDURE — 3008F BODY MASS INDEX DOCD: CPT | Performed by: NURSE PRACTITIONER

## 2020-03-10 PROCEDURE — 97802 MEDICAL NUTRITION INDIV IN: CPT | Performed by: DIETITIAN, REGISTERED

## 2020-03-10 NOTE — Clinical Note
MNT completed, for your review  I have some serious concerns about his ability to manage his diabetes  A care coordinator might be helpful

## 2020-03-10 NOTE — PROGRESS NOTES
Medical Nutrition Therapy    Assessment    Visit Type: Initial visit    Chief complaint Has type 1 diabetes, reports drinking 6 beers every night and one for lunch  Has difficulty with fingersticks so doesn't always get readings before meals  May bolus Novolog insulin after meals  States he didn't take his Lantus yet this morning  Reports being forgetful  HPI: Argelia diet history reveals that he usually eats mostly fast food and frozen meals  He occasionally ooks something, but he really wants food prep and cooking to be done within 15 minutes  His diet is generally very low in whole grains, vegetables, fruits, and he has mostly eliminated dairy due to complaints of stomach upset with intake  Diet is excessive in saturated fat and sodium and quite variable in carbohydrate  Currently meals range from 30 to 100+ grams of carbohydrate  Explained basic pathophysiology of diabetes and impact of diet on blood glucose levels  Together we discussed what foods contain CHO, reading a food label, and serving sizes  Used the portion booklet to teach Todd Reynoso more about food groups and basic carbohydrate counting  Advised him that he is drinking excessive alcohol and how this affects his health, BG control, and liver  Created an individualized meal plan for Rachid with 3 meals and 2 snacks providing 60 g carb per meal and 15-20 g carb per snack  We discussed having him limit fast food to once daily at most, to continue to make eggs, toast, and fruit for breakfast, and to add fruits or vegetables to fast food or frozen meals to improve nutrition profileThis plan will help promote weight gain and improve health  Put together sample meals for Rachid's reference  Todd Reynoso demonstrated fair understanding, Todd Reynoso will call with questions or for more education      Ht Readings from Last 1 Encounters:   03/10/20 6' (1 829 m)     Wt Readings from Last 2 Encounters:   03/10/20 71 2 kg (157 lb)   03/04/20 74 2 kg (163 lb 9 6 oz) Weight Change: Yes states usual weight is 165#    Medical Diagnosis/ICD10: E10 65     Barriers to Learning: cognitive, he admits memory problems    Do you follow any special diet presently?: Yes - avoids added salt and sugar  Who shops: patient  Who cooks: patient    Food Log: Completed via the method of food recall    Breakfast: Today, 6:30, 2 eggs cooked with a little butter, 2 pieces honey wheat bread, toasted, dry, a few strawberries OR 1 sausage Egg McMuffin, hash browns, coffee w/ 1 cream or black  Morning Snack:none  Lunch:10am-12noon, TV dinner such as chicken parm w/ pasta or Healthy Choice or Radha pizza for one or fast food - Whopper or Big Mac, medium Blossom Ripple, diet coke, has beer when at home  Afternoon Snack: small bag of chips   Dinner:frozen TV dinner as at lunch  Evening Snack:may eat cucumber slices   Usually just drinking beer  Beverages: Beer - 6-7 per day most days, maybe half a cup of water for pills  Eating out/Take out:daily to [employer] or [employer]  Exercise busy all day but no actual exercise    Calorie needs 2100 kcals/day Carbs: 60 g/meal, 15-20 g/snack         Nutrition Diagnosis:  Food and nutrition related knowledge deficit  related to Lack of prior exposure to accurate nutrition related information as evidenced by No prior knowledge of need for food and nutrition related recommendations    Intervention: carbohydrate counting, increased plant based foods, individualized meal plan, monitoring portion control, food diary and reduce beer from 6-7 daily to 1-2 or none     Treatment Goals: Patient will increase their intake of plant based foods, Patient will count carbohydrates, Patient will monitor blood glucose and will cut back on beer    Monitoring and evaluation:    Term code indicator  FH 1 3 2 Food Intake Criteria: Choose fruit and vegetables daily  Term code indicator  FH 1 6 3 Carbohydrate Intake Criteria: 60 g per meal  Term code indicator  CH 1 1 Personal Data Criteria: Check BG levels 4 times daily, usedifferent fingers, wash in warm water, shake hands down to increase circulation  Term code indicator  FH 1 5 1 Alcohol Intake Criteria: Reduce and eliminate    Patients Response to Instruction:  Nguyễn Garvin  Expected Compliancefair    Thank you for coming to the [facility] for education today  Please feel free to call with any questions or concerns      Mundo Manual  [id number]  [address] [facility] [id number]

## 2020-03-10 NOTE — PATIENT INSTRUCTIONS
1  Cut back on beer  2  Add vegetables to frozen meals  3  Aim for about 60 g carb per meal  4  Limit meals to 800mg sodium or less  5   Try to only eat fast food once per day at most

## 2020-05-12 DIAGNOSIS — E11.65 UNCONTROLLED TYPE 2 DIABETES MELLITUS WITH HYPERGLYCEMIA (HCC): ICD-10-CM

## 2020-05-12 DIAGNOSIS — E11.65 TYPE 2 DIABETES MELLITUS WITH HYPERGLYCEMIA, WITH LONG-TERM CURRENT USE OF INSULIN (HCC): ICD-10-CM

## 2020-05-12 DIAGNOSIS — Z79.4 TYPE 2 DIABETES MELLITUS WITH HYPERGLYCEMIA, WITH LONG-TERM CURRENT USE OF INSULIN (HCC): ICD-10-CM

## 2020-05-13 DIAGNOSIS — E11.65 UNCONTROLLED TYPE 2 DIABETES MELLITUS WITH HYPERGLYCEMIA (HCC): ICD-10-CM

## 2020-05-13 RX ORDER — INSULIN LISPRO 100 [IU]/ML
INJECTION, SOLUTION INTRAVENOUS; SUBCUTANEOUS
Qty: 15 ML | Refills: 3 | OUTPATIENT
Start: 2020-05-13

## 2020-06-10 DIAGNOSIS — E78.2 MIXED HYPERLIPIDEMIA: ICD-10-CM

## 2020-06-10 DIAGNOSIS — E11.9 TYPE 2 DIABETES MELLITUS WITHOUT COMPLICATION, WITH LONG-TERM CURRENT USE OF INSULIN (HCC): ICD-10-CM

## 2020-06-10 DIAGNOSIS — Z79.4 TYPE 2 DIABETES MELLITUS WITHOUT COMPLICATION, WITH LONG-TERM CURRENT USE OF INSULIN (HCC): ICD-10-CM

## 2020-06-10 PROCEDURE — 4010F ACE/ARB THERAPY RXD/TAKEN: CPT | Performed by: NURSE PRACTITIONER

## 2020-06-10 RX ORDER — SIMVASTATIN 40 MG
TABLET ORAL
Qty: 90 TABLET | Refills: 3 | Status: SHIPPED | OUTPATIENT
Start: 2020-06-10 | End: 2021-06-05

## 2020-06-10 RX ORDER — LISINOPRIL 2.5 MG/1
TABLET ORAL
Qty: 90 TABLET | Refills: 3 | Status: SHIPPED | OUTPATIENT
Start: 2020-06-10 | End: 2021-06-05

## 2020-06-10 NOTE — TELEPHONE ENCOUNTER
----- Message from Gina Boo, DO sent at 6/10/2020  7:39 AM EDT -----  Call patient  He is due/overdue for appointment  Please schedule in near future ( this can be a video visit if he prefers)    He needs to get labs done prior to visit ( lab orders have already been placed)

## 2020-06-19 ENCOUNTER — TELEPHONE (OUTPATIENT)
Dept: FAMILY MEDICINE CLINIC | Facility: CLINIC | Age: 60
End: 2020-06-19

## 2020-09-08 DIAGNOSIS — E11.9 TYPE 2 DIABETES MELLITUS WITHOUT COMPLICATION, WITH LONG-TERM CURRENT USE OF INSULIN (HCC): ICD-10-CM

## 2020-09-08 DIAGNOSIS — Z79.4 TYPE 2 DIABETES MELLITUS WITHOUT COMPLICATION, WITH LONG-TERM CURRENT USE OF INSULIN (HCC): ICD-10-CM

## 2020-09-11 ENCOUNTER — TELEPHONE (OUTPATIENT)
Dept: ENDOCRINOLOGY | Facility: CLINIC | Age: 60
End: 2020-09-11

## 2020-12-07 DIAGNOSIS — E11.9 TYPE 2 DIABETES MELLITUS WITHOUT COMPLICATION, WITH LONG-TERM CURRENT USE OF INSULIN (HCC): ICD-10-CM

## 2020-12-07 DIAGNOSIS — Z79.4 TYPE 2 DIABETES MELLITUS WITHOUT COMPLICATION, WITH LONG-TERM CURRENT USE OF INSULIN (HCC): ICD-10-CM

## 2021-01-05 ENCOUNTER — VBI (OUTPATIENT)
Dept: ADMINISTRATIVE | Facility: OTHER | Age: 61
End: 2021-01-05

## 2021-01-29 ENCOUNTER — VBI (OUTPATIENT)
Dept: ADMINISTRATIVE | Facility: OTHER | Age: 61
End: 2021-01-29

## 2021-02-01 ENCOUNTER — VBI (OUTPATIENT)
Dept: ADMINISTRATIVE | Facility: OTHER | Age: 61
End: 2021-02-01

## 2021-03-02 ENCOUNTER — LAB (OUTPATIENT)
Dept: LAB | Facility: CLINIC | Age: 61
End: 2021-03-02
Payer: COMMERCIAL

## 2021-03-02 LAB
ALBUMIN SERPL BCP-MCNC: 3.9 G/DL (ref 3.5–5)
ALP SERPL-CCNC: 81 U/L (ref 46–116)
ALT SERPL W P-5'-P-CCNC: 33 U/L (ref 12–78)
ANION GAP SERPL CALCULATED.3IONS-SCNC: 3 MMOL/L (ref 4–13)
AST SERPL W P-5'-P-CCNC: 13 U/L (ref 5–45)
BILIRUB SERPL-MCNC: 1.28 MG/DL (ref 0.2–1)
BUN SERPL-MCNC: 11 MG/DL (ref 5–25)
CALCIUM SERPL-MCNC: 8.9 MG/DL (ref 8.3–10.1)
CHLORIDE SERPL-SCNC: 103 MMOL/L (ref 100–108)
CHOLEST SERPL-MCNC: 217 MG/DL (ref 50–200)
CO2 SERPL-SCNC: 32 MMOL/L (ref 21–32)
CREAT SERPL-MCNC: 0.66 MG/DL (ref 0.6–1.3)
EST. AVERAGE GLUCOSE BLD GHB EST-MCNC: 243 MG/DL
GFR SERPL CREATININE-BSD FRML MDRD: 105 ML/MIN/1.73SQ M
GLUCOSE P FAST SERPL-MCNC: 179 MG/DL (ref 65–99)
HBA1C MFR BLD: 10.1 %
HDLC SERPL-MCNC: 66 MG/DL
LDLC SERPL CALC-MCNC: 138 MG/DL (ref 0–100)
POTASSIUM SERPL-SCNC: 4.1 MMOL/L (ref 3.5–5.3)
PROT SERPL-MCNC: 7.4 G/DL (ref 6.4–8.2)
SODIUM SERPL-SCNC: 138 MMOL/L (ref 136–145)
TRIGL SERPL-MCNC: 63 MG/DL

## 2021-03-02 PROCEDURE — 82043 UR ALBUMIN QUANTITATIVE: CPT | Performed by: NURSE PRACTITIONER

## 2021-03-02 PROCEDURE — 80053 COMPREHEN METABOLIC PANEL: CPT | Performed by: NURSE PRACTITIONER

## 2021-03-02 PROCEDURE — 80061 LIPID PANEL: CPT | Performed by: NURSE PRACTITIONER

## 2021-03-02 PROCEDURE — 36415 COLL VENOUS BLD VENIPUNCTURE: CPT | Performed by: NURSE PRACTITIONER

## 2021-03-02 PROCEDURE — 82570 ASSAY OF URINE CREATININE: CPT | Performed by: NURSE PRACTITIONER

## 2021-03-02 PROCEDURE — 83036 HEMOGLOBIN GLYCOSYLATED A1C: CPT | Performed by: NURSE PRACTITIONER

## 2021-03-02 PROCEDURE — 3046F HEMOGLOBIN A1C LEVEL >9.0%: CPT | Performed by: FAMILY MEDICINE

## 2021-03-03 LAB
CREAT UR-MCNC: 155 MG/DL
MICROALBUMIN UR-MCNC: 11.2 MG/L (ref 0–20)
MICROALBUMIN/CREAT 24H UR: 7 MG/G CREATININE (ref 0–30)

## 2021-03-03 PROCEDURE — 3061F NEG MICROALBUMINURIA REV: CPT | Performed by: FAMILY MEDICINE

## 2021-03-08 ENCOUNTER — OFFICE VISIT (OUTPATIENT)
Dept: FAMILY MEDICINE CLINIC | Facility: CLINIC | Age: 61
End: 2021-03-08
Payer: COMMERCIAL

## 2021-03-08 VITALS
DIASTOLIC BLOOD PRESSURE: 70 MMHG | HEART RATE: 74 BPM | BODY MASS INDEX: 21.54 KG/M2 | TEMPERATURE: 97.4 F | RESPIRATION RATE: 16 BRPM | OXYGEN SATURATION: 97 % | HEIGHT: 72 IN | WEIGHT: 159 LBS | SYSTOLIC BLOOD PRESSURE: 110 MMHG

## 2021-03-08 DIAGNOSIS — R17 ELEVATED BILIRUBIN: ICD-10-CM

## 2021-03-08 DIAGNOSIS — E11.65 TYPE 2 DIABETES MELLITUS WITH HYPERGLYCEMIA, UNSPECIFIED WHETHER LONG TERM INSULIN USE (HCC): ICD-10-CM

## 2021-03-08 DIAGNOSIS — E78.2 MIXED HYPERLIPIDEMIA: ICD-10-CM

## 2021-03-08 DIAGNOSIS — F10.10 ALCOHOL ABUSE: ICD-10-CM

## 2021-03-08 DIAGNOSIS — I10 BENIGN ESSENTIAL HYPERTENSION: Primary | ICD-10-CM

## 2021-03-08 DIAGNOSIS — K21.9 GASTROESOPHAGEAL REFLUX DISEASE WITHOUT ESOPHAGITIS: ICD-10-CM

## 2021-03-08 PROCEDURE — 99214 OFFICE O/P EST MOD 30 MIN: CPT | Performed by: FAMILY MEDICINE

## 2021-03-08 PROCEDURE — 3725F SCREEN DEPRESSION PERFORMED: CPT | Performed by: FAMILY MEDICINE

## 2021-03-08 PROCEDURE — 1036F TOBACCO NON-USER: CPT | Performed by: FAMILY MEDICINE

## 2021-03-08 PROCEDURE — 3008F BODY MASS INDEX DOCD: CPT | Performed by: FAMILY MEDICINE

## 2021-03-08 NOTE — ASSESSMENT & PLAN NOTE
Bilirubin from March 2nd 1 28  Discussed alcohol reduction/cessation  Will sent for ultrasound liver    Possible referral to gastro

## 2021-03-08 NOTE — ASSESSMENT & PLAN NOTE
Patient still admits to drinking 3-4 beers per day  Counseled again  Informed him of possible damage to liver  Will sent for ultrasound    Possible referral to gastro

## 2021-03-08 NOTE — PROGRESS NOTES
[facility]      NAME: Hank Barone  AGE: 61 y o  SEX: male  : 1960   MRN: [id number]    DATE: 3/8/2021  TIME: 12:14 PM    Assessment and Plan     Problem List Items Addressed This Visit     Benign essential hypertension - Primary      Well controlled on lisinopril 2 5         Mixed hyperlipidemia      Cholesterol 217/138 with HDL 60  Continue simvastatin 40 mg daily for now  Type 2 diabetes mellitus with hyperglycemia (HCC)       Lab Results   Component Value Date    HGBA1C 10 1 (H) 2021     Continues with poor control  A1c from March 2nd 10 1% (was 10 0%)  Patient currently taking Lantus 40 units daily without hypoglycemia  He did not  See endocrinologist yet due to appointments being canceled during the pandemic  Will place another referral order today  Stressed compliance  Relevant Orders    Ambulatory referral to Endocrinology    Alcohol abuse      Patient still admits to drinking 3-4 beers per day  Counseled again  Informed him of possible damage to liver  Will sent for ultrasound  Possible referral to gastro         Gastroesophageal reflux disease without esophagitis      Stable on omeprazole 20 mg daily         Elevated bilirubin       Bilirubin from   Discussed alcohol reduction/cessation  Will sent for ultrasound liver  Possible referral to gastro                   Return to office in:    6 months( will order labs if not already done so by Endocrine or gastro)    Chief Complaint     Chief Complaint   Patient presents with    Follow-up     meds  check up       History of Present Illness      Patient presents for recheck chronic medical problems today  Patient is now retired ()  He still admits to drinking 3-4 beers daily  He is a nonsmoker  He still has not seen endocrinology yet due to appointments being canceled due to pandemic  Currently taking Lantus 40 units daily without hypoglycemia    Doing well on simvastatin for cholesterol  Doing well on lisinopril for blood pressure  Patient had labs done on March 2nd      The following portions of the patient's history were reviewed and updated as appropriate: allergies, current medications, past family history, past medical history, past social history, past surgical history and problem list     Review of Systems   Review of Systems   Respiratory: Negative  Cardiovascular: Negative  Gastrointestinal: Negative  Genitourinary: Negative  Active Problem List     Patient Active Problem List   Diagnosis    Benign essential hypertension    Mixed hyperlipidemia    Type 2 diabetes mellitus with hyperglycemia (HCC)    Alcohol abuse    Gastroesophageal reflux disease without esophagitis    Type 1 diabetes mellitus with hyperglycemia (HCC)    Elevated bilirubin       Objective   /70 (BP Location: Left arm, Patient Position: Sitting, Cuff Size: Adult)   Pulse 74   Temp (!) 97 4 °F (36 3 °C) (Tympanic)   Resp 16   Ht 5' 11 65" (1 82 m)   Wt 72 1 kg (159 lb)   SpO2 97%   BMI 21 77 kg/m²   Right Foot/Ankle   Right Foot Inspection  Skin Exam: skin normal and skin intact no dry skin, no warmth, no callus, no erythema, no maceration, no abnormal color, no pre-ulcer, no ulcer and no callus                          Toe Exam: ROM and strength within normal limits  Sensory       Monofilament testing: intact  Vascular  Capillary refills: < 3 seconds  The right DP pulse is 2+  The right PT pulse is 2+  Left Foot/Ankle  Left Foot Inspection  Skin Exam: skin normal and skin intactno dry skin, no warmth, no erythema, no maceration, normal color, no pre-ulcer, no ulcer and no callus                         Toe Exam: ROM and strength within normal limits                   Sensory       Monofilament: intact  Vascular  Capillary refills: < 3 seconds  The left DP pulse is 2+  The left PT pulse is 2+  Assign Risk Category:  No deformity present;  No loss of protective sensation; No weak pulses       Risk: 0    Physical Exam  Cardiovascular:      Rate and Rhythm: Normal rate and regular rhythm  Pulses: no weak pulses          Dorsalis pedis pulses are 2+ on the right side and 2+ on the left side  Posterior tibial pulses are 2+ on the right side and 2+ on the left side  Heart sounds: Normal heart sounds  Comments: Carotids: no bruits  Ext: no edema  Pulmonary:      Effort: Pulmonary effort is normal  No respiratory distress  Breath sounds: No wheezing or rales  Feet:      Right foot:      Skin integrity: No ulcer, skin breakdown, erythema, warmth, callus or dry skin  Left foot:      Skin integrity: No ulcer, skin breakdown, erythema, warmth, callus or dry skin  Psychiatric:         Behavior: Behavior normal          Thought Content:  Thought content normal          Pertinent Laboratory/Diagnostic Studies:   labs from March 2nd reviewed    Current Medications     Current Outpatient Medications:     aspirin 81 MG tablet, Take 81 mg by mouth daily  , Disp: , Rfl:     glucose blood (ONE TOUCH ULTRA TEST) test strip, Test blood sugars 3 times a day, Disp: 100 each, Rfl: 2    HUMALOG KWIKPEN 100 units/mL injection pen, INJECT 5 UNITS UNDER THE SKIN BASED ON CARBOHYDRATE COUNTING AFTER MEALS, Disp: 15 mL, Rfl: 3    lisinopril (ZESTRIL) 2 5 mg tablet, TAKE 1 TABLET DAILY, Disp: 90 tablet, Rfl: 3    metFORMIN (GLUCOPHAGE) 500 mg tablet, TAKE 1 TABLET TWICE A DAY WITH MEALS, Disp: 180 tablet, Rfl: 0    omeprazole (PriLOSEC) 20 mg delayed release capsule, Take 1 capsule (20 mg total) by mouth daily, Disp: 30 capsule, Rfl: 2    simvastatin (ZOCOR) 40 mg tablet, TAKE 1 TABLET DAILY, Disp: 90 tablet, Rfl: 3    insulin glargine (Lantus SoloStar) 100 units/mL injection pen, Inject 40 Units under the skin daily, Disp: 75 mL, Rfl: 5    Health Maintenance     Health Maintenance   Topic Date Due    Annual Physical  10/11/1978    Colorectal Cancer Screening 10/11/2010    DM Eye Exam  09/24/2019    Influenza Vaccine (1) 09/01/2020    Diabetic Foot Exam  12/13/2020    HEMOGLOBIN A1C  09/02/2021    Depression Screening PHQ  03/08/2022    BMI: Adult  03/08/2022    DTaP,Tdap,and Td Vaccines (2 - Td) 06/25/2023    Pneumococcal Vaccine: Pediatrics (0 to 5 Years) and At-Risk Patients (6 to 59 Years)  Completed    HIV Screening  Addressed    Hepatitis C Screening  Addressed    HIB Vaccine  Aged Out    Hepatitis B Vaccine  Aged Out    IPV Vaccine  Aged Out    Hepatitis A Vaccine  Aged Out    Meningococcal ACWY Vaccine  Aged Out    HPV Vaccine  Aged Out     Immunization History   Administered Date(s) Administered    Influenza Quadrivalent, 6-35 Months IM 10/04/2017    Influenza, recombinant, quadrivalent,injectable, preservative free 09/24/2018, 12/13/2019    Influenza, seasonal, injectable 12/13/2012    Pneumococcal Polysaccharide PPV23 12/13/2012    Tdap 06/25/2013       Marilee Juarez DO  [facility]

## 2021-03-08 NOTE — ASSESSMENT & PLAN NOTE
Lab Results   Component Value Date    HGBA1C 10 1 (H) 03/02/2021     Continues with poor control  A1c from March 2nd 10 1% (was 10 0%)  Patient currently taking Lantus 40 units daily without hypoglycemia  He did not  See endocrinologist yet due to appointments being canceled during the pandemic  Will place another referral order today  Stressed compliance

## 2021-03-17 ENCOUNTER — HOSPITAL ENCOUNTER (OUTPATIENT)
Dept: ULTRASOUND IMAGING | Facility: MEDICAL CENTER | Age: 61
Discharge: HOME | End: 2021-03-17
Payer: COMMERCIAL

## 2021-03-17 ENCOUNTER — TELEPHONE (OUTPATIENT)
Dept: FAMILY MEDICINE CLINIC | Facility: CLINIC | Age: 61
End: 2021-03-17

## 2021-03-17 DIAGNOSIS — R79.89 ELEVATED LIVER FUNCTION TESTS: ICD-10-CM

## 2021-03-17 PROCEDURE — 76705 ECHO EXAM OF ABDOMEN: CPT

## 2021-03-17 NOTE — TELEPHONE ENCOUNTER
----- Message from Lisa Moe DO sent at 3/17/2021 12:34 PM EDT -----  Call patient, the ultrasound of his liver was negative  Recommend continue to decrease his alcohol ingestion    Will continue to monitor his liver function blood tests on a regular basis

## 2021-05-04 DIAGNOSIS — E11.65 UNCONTROLLED TYPE 2 DIABETES MELLITUS WITH HYPERGLYCEMIA (HCC): ICD-10-CM

## 2021-05-04 RX ORDER — INSULIN GLARGINE 100 [IU]/ML
INJECTION, SOLUTION SUBCUTANEOUS
Qty: 75 ML | Refills: 3 | Status: SHIPPED | OUTPATIENT
Start: 2021-05-04 | End: 2022-07-18 | Stop reason: SDUPTHER

## 2021-05-06 ENCOUNTER — TELEPHONE (OUTPATIENT)
Dept: FAMILY MEDICINE CLINIC | Facility: CLINIC | Age: 61
End: 2021-05-06

## 2021-06-05 DIAGNOSIS — E11.9 TYPE 2 DIABETES MELLITUS WITHOUT COMPLICATION, WITH LONG-TERM CURRENT USE OF INSULIN (HCC): ICD-10-CM

## 2021-06-05 DIAGNOSIS — E78.2 MIXED HYPERLIPIDEMIA: ICD-10-CM

## 2021-06-05 DIAGNOSIS — Z79.4 TYPE 2 DIABETES MELLITUS WITHOUT COMPLICATION, WITH LONG-TERM CURRENT USE OF INSULIN (HCC): ICD-10-CM

## 2021-06-05 RX ORDER — LISINOPRIL 2.5 MG/1
TABLET ORAL
Qty: 90 TABLET | Refills: 3 | Status: SHIPPED | OUTPATIENT
Start: 2021-06-05 | End: 2021-09-09 | Stop reason: SDUPTHER

## 2021-06-05 RX ORDER — SIMVASTATIN 40 MG
TABLET ORAL
Qty: 90 TABLET | Refills: 3 | Status: SHIPPED | OUTPATIENT
Start: 2021-06-05 | End: 2021-09-09 | Stop reason: SDUPTHER

## 2021-09-03 ENCOUNTER — RA CDI HCC (OUTPATIENT)
Dept: OTHER | Facility: HOSPITAL | Age: 61
End: 2021-09-03

## 2021-09-03 NOTE — PROGRESS NOTES
[facility]  coding opportunities       Chart reviewed, no opportunity found: CHART REVIEWED, NO OPPORTUNITY FOUND                        Patients insurance company: [facility] [address]  (Medicare Advantage and Commercial)

## 2021-09-09 ENCOUNTER — OFFICE VISIT (OUTPATIENT)
Dept: FAMILY MEDICINE CLINIC | Facility: CLINIC | Age: 61
End: 2021-09-09
Payer: COMMERCIAL

## 2021-09-09 VITALS
WEIGHT: 154 LBS | BODY MASS INDEX: 21.56 KG/M2 | HEIGHT: 71 IN | DIASTOLIC BLOOD PRESSURE: 70 MMHG | TEMPERATURE: 97.5 F | HEART RATE: 88 BPM | OXYGEN SATURATION: 98 % | RESPIRATION RATE: 16 BRPM | SYSTOLIC BLOOD PRESSURE: 110 MMHG

## 2021-09-09 DIAGNOSIS — I10 BENIGN ESSENTIAL HYPERTENSION: ICD-10-CM

## 2021-09-09 DIAGNOSIS — E11.9 TYPE 2 DIABETES MELLITUS WITHOUT COMPLICATION, WITH LONG-TERM CURRENT USE OF INSULIN (HCC): ICD-10-CM

## 2021-09-09 DIAGNOSIS — K21.9 GASTROESOPHAGEAL REFLUX DISEASE WITHOUT ESOPHAGITIS: ICD-10-CM

## 2021-09-09 DIAGNOSIS — Z79.4 TYPE 2 DIABETES MELLITUS WITHOUT COMPLICATION, WITH LONG-TERM CURRENT USE OF INSULIN (HCC): ICD-10-CM

## 2021-09-09 DIAGNOSIS — E78.2 MIXED HYPERLIPIDEMIA: ICD-10-CM

## 2021-09-09 DIAGNOSIS — F10.10 ALCOHOL ABUSE: ICD-10-CM

## 2021-09-09 DIAGNOSIS — E11.65 TYPE 2 DIABETES MELLITUS WITH HYPERGLYCEMIA, UNSPECIFIED WHETHER LONG TERM INSULIN USE (HCC): Primary | ICD-10-CM

## 2021-09-09 DIAGNOSIS — Z12.11 SCREENING FOR COLON CANCER: ICD-10-CM

## 2021-09-09 DIAGNOSIS — Z12.5 SCREENING FOR PROSTATE CANCER: ICD-10-CM

## 2021-09-09 DIAGNOSIS — R17 ELEVATED BILIRUBIN: ICD-10-CM

## 2021-09-09 LAB — SL AMB POCT HEMOGLOBIN AIC: 9.7 (ref ?–6.5)

## 2021-09-09 PROCEDURE — 3046F HEMOGLOBIN A1C LEVEL >9.0%: CPT | Performed by: FAMILY MEDICINE

## 2021-09-09 PROCEDURE — 1036F TOBACCO NON-USER: CPT | Performed by: FAMILY MEDICINE

## 2021-09-09 PROCEDURE — 4010F ACE/ARB THERAPY RXD/TAKEN: CPT | Performed by: FAMILY MEDICINE

## 2021-09-09 PROCEDURE — 99214 OFFICE O/P EST MOD 30 MIN: CPT | Performed by: FAMILY MEDICINE

## 2021-09-09 PROCEDURE — 83036 HEMOGLOBIN GLYCOSYLATED A1C: CPT | Performed by: FAMILY MEDICINE

## 2021-09-09 PROCEDURE — 3078F DIAST BP <80 MM HG: CPT | Performed by: FAMILY MEDICINE

## 2021-09-09 PROCEDURE — 3008F BODY MASS INDEX DOCD: CPT | Performed by: FAMILY MEDICINE

## 2021-09-09 PROCEDURE — 3074F SYST BP LT 130 MM HG: CPT | Performed by: FAMILY MEDICINE

## 2021-09-09 RX ORDER — SIMVASTATIN 40 MG
40 TABLET ORAL DAILY
Qty: 30 TABLET | Refills: 5 | Status: SHIPPED | OUTPATIENT
Start: 2021-09-09

## 2021-09-09 RX ORDER — LISINOPRIL 2.5 MG/1
2.5 TABLET ORAL DAILY
Qty: 30 TABLET | Refills: 5 | Status: SHIPPED | OUTPATIENT
Start: 2021-09-09

## 2021-09-09 NOTE — ASSESSMENT & PLAN NOTE
Patient has not been taking his simvastatin 40 due to expense  I strongly encouraged that he get this filled in told him that it was not an expensive medication  Will recheck lipids in near future    Will call with results

## 2021-09-09 NOTE — ASSESSMENT & PLAN NOTE
Bilirubin from March 2021 was 1 28 (previously 2 02)  ultrasound of liver from 3/17/2021 was negative  Patient has not gotten blood work done yet  He has not jaundiced today  I strongly encouraged that he get labs done as soon as possible    Will call with results

## 2021-09-09 NOTE — PROGRESS NOTES
[facility]      NAME: Kendal Whyte  AGE: 61 y o  SEX: male  : 1960   MRN: [id number]    DATE: 2021  TIME: 8:34 AM    Assessment and Plan     Problem List Items Addressed This Visit     Benign essential hypertension      Well controlled  Will restart lisinopril 2 5  Renal protective dose         Relevant Medications    lisinopril (ZESTRIL) 2 5 mg tablet    Other Relevant Orders    CBC and differential    TSH, 3rd generation with Free T4 reflex    Mixed hyperlipidemia      Patient has not been taking his simvastatin 40 due to expense  I strongly encouraged that he get this filled in told him that it was not an expensive medication  Will recheck lipids in near future  Will call with results         Relevant Medications    simvastatin (ZOCOR) 40 mg tablet    Other Relevant Orders    Comprehensive metabolic panel    Lipid Panel with Direct LDL reflex    Type 2 diabetes mellitus with hyperglycemia (HCC) - Primary       Lab Results   Component Value Date    HGBA1C 10 1 (H) 2021    rapid A1c today 9 7% (was 10 1%)  Patient has been using his insulin sparingly lately due to poor formulary coverage  He has been averaging 20-25 units per day of Lantus and uses NovoLog sliding scale  I again strongly encouraged him to be evaluated by endocrinology  I gave him another referral order today  He states he will call to schedule         Relevant Orders    Microalbumin / creatinine urine ratio    Ambulatory referral to Endocrinology    Alcohol abuse       Still drinking approximately 3-4 beers per day  Patient only drinks afternoon evening  Does not drive  Does not drink in mornings  Counseled again today  Consider alcohol counseling  Will continue to monitor         Gastroesophageal reflux disease without esophagitis       Doing well off and acids  Patient has OTC omeprazole if needed         Elevated bilirubin      Bilirubin from 2021 was 1 28 (previously 2 02)  ultrasound of liver from 3/17/2021 was negative  Patient has not gotten blood work done yet  He has not jaundiced today  I strongly encouraged that he get labs done as soon as possible  Will call with results         Relevant Orders    Comprehensive metabolic panel      Other Visit Diagnoses     Screening for colon cancer        Relevant Orders    Ambulatory referral for colonoscopy    Screening for prostate cancer        Relevant Orders    PSA, Total Screen    Type 2 diabetes mellitus without complication, with long-term current use of insulin ([address] )        Relevant Medications    lisinopril (ZESTRIL) 2 5 mg tablet          Rx given for FBW (except A1C)  Will call w/ results    Return to office in:  3 mos, Will most likely needs labs (if not ordered by endocrine)    Chief Complaint     Chief Complaint   Patient presents with    Follow-up     6 months       History of Present Illness     Patient presents recheck chronic medical problems today  Patient has been using his insulin sparingly recently due to poor formulary coverage  He is also not taking any of his oral medications due to expense  Patient still drinks approximately 3-4 beers per day overall he feels well without any complaints today  The following portions of the patient's history were reviewed and updated as appropriate: allergies, current medications, past family history, past medical history, past social history, past surgical history and problem list     Review of Systems   Review of Systems   Respiratory: Negative  Cardiovascular: Negative  Gastrointestinal: Negative  Genitourinary: Negative          Active Problem List     Patient Active Problem List   Diagnosis    Benign essential hypertension    Mixed hyperlipidemia    Type 2 diabetes mellitus with hyperglycemia (HCC)    Alcohol abuse    Gastroesophageal reflux disease without esophagitis    Type 1 diabetes mellitus with hyperglycemia (HCC)    Elevated bilirubin Objective   /70 (BP Location: Left arm, Patient Position: Sitting, Cuff Size: Adult)   Pulse 88   Temp 97 5 °F (36 4 °C) (Temporal)   Resp 16   Ht 5' 11" (1 803 m)   Wt 69 9 kg (154 lb)   SpO2 98%   BMI 21 48 kg/m²     Physical Exam  Cardiovascular:      Rate and Rhythm: Normal rate and regular rhythm  Heart sounds: Normal heart sounds  Comments: Carotids: no bruits  Ext: no edema  Pulmonary:      Effort: Pulmonary effort is normal  No respiratory distress  Breath sounds: No wheezing or rales  Psychiatric:         Behavior: Behavior normal          Thought Content:  Thought content normal          Pertinent Laboratory/Diagnostic Studies:  Last labs reviewed    Current Medications     Current Outpatient Medications:     aspirin 81 MG tablet, Take 81 mg by mouth daily  , Disp: , Rfl:     glucose blood (ONE TOUCH ULTRA TEST) test strip, Test blood sugars 3 times a day, Disp: 100 each, Rfl: 2    HUMALOG KWIKPEN 100 units/mL injection pen, INJECT 5 UNITS UNDER THE SKIN BASED ON CARBOHYDRATE COUNTING AFTER MEALS, Disp: 15 mL, Rfl: 3    Lantus SoloStar 100 units/mL injection pen, INJECT 40 UNITS UNDER THE SKIN DAILY, Disp: 75 mL, Rfl: 3    lisinopril (ZESTRIL) 2 5 mg tablet, Take 1 tablet (2 5 mg total) by mouth daily, Disp: 30 tablet, Rfl: 5    metFORMIN (GLUCOPHAGE) 500 mg tablet, TAKE 1 TABLET TWICE A DAY WITH MEALS, Disp: 180 tablet, Rfl: 0    omeprazole (PriLOSEC) 20 mg delayed release capsule, Take 1 capsule (20 mg total) by mouth daily, Disp: 30 capsule, Rfl: 2    simvastatin (ZOCOR) 40 mg tablet, Take 1 tablet (40 mg total) by mouth daily, Disp: 30 tablet, Rfl: 5    Health Maintenance     Health Maintenance   Topic Date Due    Annual Physical  Never done    Colorectal Cancer Screening  Never done    DM Eye Exam  09/24/2019    COVID-19 Vaccine (2 - Moderna 2-dose series) 05/14/2021    HEMOGLOBIN A1C  09/02/2021    Influenza Vaccine (1) 09/01/2021    Depression Screening PHQ  03/08/2022    Diabetic Foot Exam  03/08/2022    BMI: Adult  09/09/2022    DTaP,Tdap,and Td Vaccines (2 - Td or Tdap) 06/25/2023    Pneumococcal Vaccine: Pediatrics (0 to 5 Years) and At-Risk Patients (6 to 59 Years) (2 of 2 - PPSV23) 10/11/2025    Hepatitis C Screening  Completed    HIV Screening  Addressed    HIB Vaccine  Aged Out    Hepatitis B Vaccine  Aged Out    IPV Vaccine  Aged Out    Hepatitis A Vaccine  Aged Out    Meningococcal ACWY Vaccine  Aged Out    HPV Vaccine  Aged Out     Immunization History   Administered Date(s) Administered    Influenza Quadrivalent, 6-35 Months IM 10/04/2017    Influenza, recombinant, quadrivalent,injectable, preservative free 09/24/2018, 12/13/2019    Influenza, seasonal, injectable 12/13/2012    Pneumococcal Polysaccharide PPV23 12/13/2012    SARS-CoV-2 / COVID-19 mRNA IM (Moderna) 04/16/2021    Tdap 06/25/2013       Claribel Whyte DO  [facility]

## 2021-09-09 NOTE — ASSESSMENT & PLAN NOTE
Still drinking approximately 3-4 beers per day  Patient only drinks afternoon evening  Does not drive  Does not drink in mornings  Counseled again today  Consider alcohol counseling    Will continue to monitor

## 2021-09-09 NOTE — ASSESSMENT & PLAN NOTE
Lab Results   Component Value Date    HGBA1C 10 1 (H) 03/02/2021    rapid A1c today 9 7% (was 10 1%)  Patient has been using his insulin sparingly lately due to poor formulary coverage  He has been averaging 20-25 units per day of Lantus and uses NovoLog sliding scale  I again strongly encouraged him to be evaluated by endocrinology  I gave him another referral order today    He states he will call to schedule

## 2021-09-11 ENCOUNTER — APPOINTMENT (OUTPATIENT)
Dept: LAB | Facility: CLINIC | Age: 61
End: 2021-09-11
Payer: COMMERCIAL

## 2021-09-11 DIAGNOSIS — E78.2 MIXED HYPERLIPIDEMIA: ICD-10-CM

## 2021-09-11 DIAGNOSIS — Z12.5 SCREENING FOR PROSTATE CANCER: ICD-10-CM

## 2021-09-11 DIAGNOSIS — I10 BENIGN ESSENTIAL HYPERTENSION: ICD-10-CM

## 2021-09-11 DIAGNOSIS — R17 ELEVATED BILIRUBIN: ICD-10-CM

## 2021-09-11 DIAGNOSIS — E11.65 TYPE 2 DIABETES MELLITUS WITH HYPERGLYCEMIA, UNSPECIFIED WHETHER LONG TERM INSULIN USE (HCC): ICD-10-CM

## 2021-09-11 LAB
ALBUMIN SERPL BCP-MCNC: 3.4 G/DL (ref 3.5–5)
ALP SERPL-CCNC: 81 U/L (ref 46–116)
ALT SERPL W P-5'-P-CCNC: 27 U/L (ref 12–78)
ANION GAP SERPL CALCULATED.3IONS-SCNC: 3 MMOL/L (ref 4–13)
AST SERPL W P-5'-P-CCNC: 17 U/L (ref 5–45)
BASOPHILS # BLD AUTO: 0.03 THOUSANDS/ΜL (ref 0–0.1)
BASOPHILS NFR BLD AUTO: 1 % (ref 0–1)
BILIRUB SERPL-MCNC: 1.27 MG/DL (ref 0.2–1)
BUN SERPL-MCNC: 10 MG/DL (ref 5–25)
CALCIUM ALBUM COR SERPL-MCNC: 8.9 MG/DL (ref 8.3–10.1)
CALCIUM SERPL-MCNC: 8.4 MG/DL (ref 8.3–10.1)
CHLORIDE SERPL-SCNC: 103 MMOL/L (ref 100–108)
CHOLEST SERPL-MCNC: 173 MG/DL (ref 50–200)
CO2 SERPL-SCNC: 30 MMOL/L (ref 21–32)
CREAT SERPL-MCNC: 0.56 MG/DL (ref 0.6–1.3)
CREAT UR-MCNC: 119 MG/DL
EOSINOPHIL # BLD AUTO: 0.15 THOUSAND/ΜL (ref 0–0.61)
EOSINOPHIL NFR BLD AUTO: 3 % (ref 0–6)
ERYTHROCYTE [DISTWIDTH] IN BLOOD BY AUTOMATED COUNT: 13 % (ref 11.6–15.1)
GFR SERPL CREATININE-BSD FRML MDRD: 112 ML/MIN/1.73SQ M
GLUCOSE P FAST SERPL-MCNC: 115 MG/DL (ref 65–99)
HCT VFR BLD AUTO: 42 % (ref 36.5–49.3)
HDLC SERPL-MCNC: 48 MG/DL
HGB BLD-MCNC: 13.2 G/DL (ref 12–17)
IMM GRANULOCYTES # BLD AUTO: 0.01 THOUSAND/UL (ref 0–0.2)
IMM GRANULOCYTES NFR BLD AUTO: 0 % (ref 0–2)
LDLC SERPL CALC-MCNC: 97 MG/DL (ref 0–100)
LYMPHOCYTES # BLD AUTO: 1.66 THOUSANDS/ΜL (ref 0.6–4.47)
LYMPHOCYTES NFR BLD AUTO: 34 % (ref 14–44)
MCH RBC QN AUTO: 28.9 PG (ref 26.8–34.3)
MCHC RBC AUTO-ENTMCNC: 31.4 G/DL (ref 31.4–37.4)
MCV RBC AUTO: 92 FL (ref 82–98)
MICROALBUMIN UR-MCNC: 7.7 MG/L (ref 0–20)
MICROALBUMIN/CREAT 24H UR: 6 MG/G CREATININE (ref 0–30)
MONOCYTES # BLD AUTO: 0.57 THOUSAND/ΜL (ref 0.17–1.22)
MONOCYTES NFR BLD AUTO: 12 % (ref 4–12)
NEUTROPHILS # BLD AUTO: 2.54 THOUSANDS/ΜL (ref 1.85–7.62)
NEUTS SEG NFR BLD AUTO: 50 % (ref 43–75)
NRBC BLD AUTO-RTO: 0 /100 WBCS
PLATELET # BLD AUTO: 268 THOUSANDS/UL (ref 149–390)
PMV BLD AUTO: 10.6 FL (ref 8.9–12.7)
POTASSIUM SERPL-SCNC: 4.3 MMOL/L (ref 3.5–5.3)
PROT SERPL-MCNC: 6.6 G/DL (ref 6.4–8.2)
PSA SERPL-MCNC: 0.4 NG/ML (ref 0–4)
RBC # BLD AUTO: 4.56 MILLION/UL (ref 3.88–5.62)
SODIUM SERPL-SCNC: 136 MMOL/L (ref 136–145)
TRIGL SERPL-MCNC: 141 MG/DL
TSH SERPL DL<=0.05 MIU/L-ACNC: 1.07 UIU/ML (ref 0.36–3.74)
WBC # BLD AUTO: 4.96 THOUSAND/UL (ref 4.31–10.16)

## 2021-09-11 PROCEDURE — 80053 COMPREHEN METABOLIC PANEL: CPT

## 2021-09-11 PROCEDURE — 80061 LIPID PANEL: CPT

## 2021-09-11 PROCEDURE — 82570 ASSAY OF URINE CREATININE: CPT

## 2021-09-11 PROCEDURE — 3061F NEG MICROALBUMINURIA REV: CPT | Performed by: FAMILY MEDICINE

## 2021-09-11 PROCEDURE — G0103 PSA SCREENING: HCPCS

## 2021-09-11 PROCEDURE — 85025 COMPLETE CBC W/AUTO DIFF WBC: CPT

## 2021-09-11 PROCEDURE — 36415 COLL VENOUS BLD VENIPUNCTURE: CPT

## 2021-09-11 PROCEDURE — 82043 UR ALBUMIN QUANTITATIVE: CPT

## 2021-09-11 PROCEDURE — 84443 ASSAY THYROID STIM HORMONE: CPT

## 2021-09-13 ENCOUNTER — TELEPHONE (OUTPATIENT)
Dept: GASTROENTEROLOGY | Facility: CLINIC | Age: 61
End: 2021-09-13

## 2021-09-13 NOTE — TELEPHONE ENCOUNTER
Patient left message Friday evening asking for a call to schedule an appointment  I returned his call and he would like to schedule a colonoscopy but he was driving   Will call when he gets home, being referred by [facility]

## 2021-09-14 ENCOUNTER — TELEPHONE (OUTPATIENT)
Dept: GASTROENTEROLOGY | Facility: CLINIC | Age: 61
End: 2021-09-14

## 2021-09-14 ENCOUNTER — PREP FOR PROCEDURE (OUTPATIENT)
Dept: GASTROENTEROLOGY | Facility: CLINIC | Age: 61
End: 2021-09-14

## 2021-09-14 DIAGNOSIS — Z12.11 SCREENING FOR COLON CANCER: Primary | ICD-10-CM

## 2021-09-14 NOTE — TELEPHONE ENCOUNTER
09/14/21  Screened by: Yany Zambrano    Referring Provider Dr Selby Done    Pre- Screening: There is no height or weight on file to calculate BMI  Ht 70", wt 150lbs  Has patient been referred for a routine screening Colonoscopy? yes  Is the patient between 39-70 years old? yes      Previous Colonoscopy no   If yes:    Date: na    Facility: na    Reason: na      SCHEDULING STAFF: If the patient is between 45yrs-49yrs, please advise patient to confirm benefits/coverage with their insurance company for a routine screening colonoscopy, some insurance carriers will only cover at [facility] or [facility]  If the patient is over 66years old, please schedule an office visit  Does the patient want to see a Gastroenterologist prior to their procedure OR are they having any GI symptoms? no    Has the patient been hospitalized or had abdominal surgery in the past 6 months? no    Does the patient use supplemental oxygen? no    Does the patient take Coumadin, Lovenox, Plavix, Elliquis, Xarelto, or other blood thinning medication? no    Has the patient had a stroke, cardiac event, or stent placed in the past year? no    SCHEDULING STAFF: If patient answers NO to above questions, then schedule procedure  If patient answers YES to above questions, then schedule office appointment  If patient is between 45yrs - 49yrs, please advise patient that we will have to confirm benefits & coverage with their insurance company for a routine screening colonoscopy

## 2021-11-12 ENCOUNTER — ANESTHESIA (OUTPATIENT)
Dept: GASTROENTEROLOGY | Facility: AMBULATORY SURGERY CENTER | Age: 61
End: 2021-11-12

## 2021-11-12 ENCOUNTER — ANESTHESIA EVENT (OUTPATIENT)
Dept: GASTROENTEROLOGY | Facility: AMBULATORY SURGERY CENTER | Age: 61
End: 2021-11-12

## 2021-11-12 ENCOUNTER — HOSPITAL ENCOUNTER (OUTPATIENT)
Dept: GASTROENTEROLOGY | Facility: AMBULATORY SURGERY CENTER | Age: 61
Discharge: HOME | End: 2021-11-12
Payer: COMMERCIAL

## 2021-11-12 VITALS
HEIGHT: 72 IN | HEART RATE: 92 BPM | DIASTOLIC BLOOD PRESSURE: 73 MMHG | SYSTOLIC BLOOD PRESSURE: 116 MMHG | RESPIRATION RATE: 18 BRPM | TEMPERATURE: 96.5 F | OXYGEN SATURATION: 100 % | BODY MASS INDEX: 20.72 KG/M2 | WEIGHT: 153 LBS

## 2021-11-12 DIAGNOSIS — Z12.11 SCREENING FOR COLON CANCER: ICD-10-CM

## 2021-11-12 PROCEDURE — 45385 COLONOSCOPY W/LESION REMOVAL: CPT | Performed by: INTERNAL MEDICINE

## 2021-11-12 PROCEDURE — 45380 COLONOSCOPY AND BIOPSY: CPT | Performed by: INTERNAL MEDICINE

## 2021-11-12 PROCEDURE — 88305 TISSUE EXAM BY PATHOLOGIST: CPT | Performed by: PATHOLOGY

## 2021-11-12 PROCEDURE — 00811 ANES LWR INTST NDSC NOS: CPT | Performed by: NURSE ANESTHETIST, CERTIFIED REGISTERED

## 2021-11-12 RX ORDER — SODIUM CHLORIDE 9 MG/ML
INJECTION, SOLUTION INTRAVENOUS CONTINUOUS PRN
Status: DISCONTINUED | OUTPATIENT
Start: 2021-11-12 | End: 2021-11-12

## 2021-11-12 RX ORDER — PROPOFOL 10 MG/ML
INJECTION, EMULSION INTRAVENOUS AS NEEDED
Status: DISCONTINUED | OUTPATIENT
Start: 2021-11-12 | End: 2021-11-12

## 2021-11-12 RX ORDER — SODIUM CHLORIDE 9 MG/ML
30 INJECTION, SOLUTION INTRAVENOUS CONTINUOUS
Status: DISCONTINUED | OUTPATIENT
Start: 2021-11-12 | End: 2021-11-16 | Stop reason: HOSPADM

## 2021-11-12 RX ADMIN — PROPOFOL 100 MG: 10 INJECTION, EMULSION INTRAVENOUS at 10:32

## 2021-11-12 RX ADMIN — SODIUM CHLORIDE: 9 INJECTION, SOLUTION INTRAVENOUS at 10:26

## 2021-11-12 RX ADMIN — PROPOFOL 150 MG: 10 INJECTION, EMULSION INTRAVENOUS at 10:28

## 2022-01-03 ENCOUNTER — OFFICE VISIT (OUTPATIENT)
Dept: URGENT CARE | Facility: CLINIC | Age: 62
End: 2022-01-03
Payer: COMMERCIAL

## 2022-01-03 DIAGNOSIS — B96.89 BACTERIAL URI: Primary | ICD-10-CM

## 2022-01-03 DIAGNOSIS — J06.9 BACTERIAL URI: Primary | ICD-10-CM

## 2022-01-03 PROCEDURE — G0382 LEV 3 HOSP TYPE B ED VISIT: HCPCS | Performed by: NURSE PRACTITIONER

## 2022-01-03 RX ORDER — AZITHROMYCIN 250 MG/1
TABLET, FILM COATED ORAL
Qty: 6 TABLET | Refills: 0 | Status: SHIPPED | OUTPATIENT
Start: 2022-01-03 | End: 2022-01-07

## 2022-01-03 NOTE — PROGRESS NOTES
[facility]        NAME: Clara Hebert is a 64 y o  male  : 1960    MRN: [id number]  DATE: January 3, 2022  TIME: 10:11 AM    Assessment and Plan   Bacterial URI [J06 9, B96 89]  1  Bacterial URI  azithromycin (ZITHROMAX) 250 mg tablet     Start course of zithromax   May trial nyquil or unisom at night   F/u with pcp in 3-5 days  Pt in agreement with plan     Patient Instructions     Follow up with PCP in 3-5 days  Proceed to  ER if symptoms worsen  Chief Complaint     Chief Complaint   Patient presents with   [place]ta Claw Like Symptoms     Patient states that on  he was exposed to people that have [place]  He is going on vacation in a week and wants feel better         History of Present Illness   Clara Hebert presents to the clinic c/o    States was exposed to covid on xmas -   On dec 26 started with what he would call a sinus infection - has had symptoms since  Getting a little better but still super congested at night - wakes up 3 times - can't breathe through his nose  Leaving for vacation soon -   He had 1 dose of covid vaccine       Review of Systems   Review of Systems   All other systems reviewed and are negative          Current Medications     Long-Term Medications   Medication Sig Dispense Refill    aspirin 81 MG tablet Take 81 mg by mouth daily        HUMALOG KWIKPEN 100 units/mL injection pen INJECT 5 UNITS UNDER THE SKIN BASED ON CARBOHYDRATE COUNTING AFTER MEALS 15 mL 3    Lantus SoloStar 100 units/mL injection pen INJECT 40 UNITS UNDER THE SKIN DAILY 75 mL 3    lisinopril (ZESTRIL) 2 5 mg tablet Take 1 tablet (2 5 mg total) by mouth daily 30 tablet 5    simvastatin (ZOCOR) 40 mg tablet Take 1 tablet (40 mg total) by mouth daily 30 tablet 5       Current Allergies     Allergies as of 2022 - Reviewed 2022   Allergen Reaction Noted    Sulfamethoxazole-trimethoprim  2012            The following portions of the patient's history were reviewed and updated as appropriate: allergies, current medications, past family history, past medical history, past social history, past surgical history and problem list     Objective   There were no vitals taken for this visit  Physical Exam     Physical Exam  Vitals and nursing note reviewed  Constitutional:       Appearance: Normal appearance  He is well-developed  HENT:      Head: Normocephalic and atraumatic  Right Ear: Tympanic membrane, ear canal and external ear normal       Left Ear: Tympanic membrane, ear canal and external ear normal       Nose: Mucosal edema and congestion present  Eyes:      General: Lids are normal       Conjunctiva/sclera: Conjunctivae normal       Pupils: Pupils are equal, round, and reactive to light  Cardiovascular:      Rate and Rhythm: Normal rate and regular rhythm  Heart sounds: Normal heart sounds, S1 normal and S2 normal    Pulmonary:      Effort: Pulmonary effort is normal       Breath sounds: Normal breath sounds  Musculoskeletal:      Cervical back: Normal range of motion and neck supple  Skin:     General: Skin is warm and dry  Neurological:      Mental Status: He is alert  Psychiatric:         Speech: Speech normal          Behavior: Behavior normal          Thought Content:  Thought content normal          Judgment: Judgment normal

## 2022-01-03 NOTE — PATIENT INSTRUCTIONS
Sinusitis   WHAT YOU NEED TO KNOW:   Sinusitis is inflammation or infection of your sinuses  Sinusitis is most often caused by a virus  Acute sinusitis may last up to 12 weeks  Chronic sinusitis lasts longer than 12 weeks  Recurrent sinusitis means you have 4 or more infections in 1 year  DISCHARGE INSTRUCTIONS:   Return to the emergency department if:   · You have trouble breathing or wheezing that is getting worse  · You have a stiff neck, a fever, or a bad headache  · You cannot open your eye  · Your eyeball bulges out or you cannot move your eye  · You are more sleepy than normal, or you notice changes in your ability to think, move, or talk  · You have swelling of your forehead or scalp  Call your doctor if:   · You have vision changes, such as double vision  · Your eye and eyelid are red, swollen, and painful  · Your symptoms do not improve or go away after 10 days  · You have nausea and are vomiting  · Your nose is bleeding  · You have questions or concerns about your condition or care  Medicines: Your symptoms may go away on their own  Your healthcare provider may recommend watchful waiting for up to 10 days before starting antibiotics  You may need any of the following:  · Acetaminophen  decreases pain and fever  It is available without a doctor's order  Ask how much to take and how often to take it  Follow directions  Read the labels of all other medicines you are using to see if they also contain acetaminophen, or ask your doctor or pharmacist  Acetaminophen can cause liver damage if not taken correctly  Do not use more than 4 grams (4,000 milligrams) total of acetaminophen in one day  · NSAIDs , such as ibuprofen, help decrease swelling, pain, and fever  This medicine is available with or without a doctor's order  NSAIDs can cause stomach bleeding or kidney problems in certain people   If you take blood thinner medicine, always ask your healthcare provider if NSAIDs are safe for you  Always read the medicine label and follow directions  · Nasal steroid sprays  may help decrease inflammation in your nose and sinuses  · Decongestants  help reduce swelling and drain mucus in the nose and sinuses  They may help you breathe easier  · Antihistamines  help dry mucus in the nose and relieve sneezing  · Antibiotics  help treat or prevent a bacterial infection  · Take your medicine as directed  Contact your healthcare provider if you think your medicine is not helping or if you have side effects  Tell him or her if you are allergic to any medicine  Keep a list of the medicines, vitamins, and herbs you take  Include the amounts, and when and why you take them  Bring the list or the pill bottles to follow-up visits  Carry your medicine list with you in case of an emergency  Self-care:   · Rinse your sinuses as directed  Use a sinus rinse device to rinse your nasal passages with a saline (salt water) solution or distilled water  Do not use tap water  This will help thin the mucus in your nose and rinse away pollen and dirt  It will also help reduce swelling so you can breathe normally  · Use a humidifier  to increase air moisture in your home  This may make it easier for you to breathe and help decrease your cough  · Sleep with your head elevated  Place an extra pillow under your head before you go to sleep to help your sinuses drain  · Drink liquids as directed  Ask your healthcare provider how much liquid to drink each day and which liquids are best for you  Liquids will thin the mucus in your nose and help it drain  Avoid drinks that contain alcohol or caffeine  · Do not smoke, and avoid secondhand smoke  Nicotine and other chemicals in cigarettes and cigars can make your symptoms worse  Ask your healthcare provider for information if you currently smoke and need help to quit   E-cigarettes or smokeless tobacco still contain nicotine  Talk to your healthcare provider before you use these products  Prevent the spread of germs:   · Wash your hands often with soap and water  Wash your hands after you use the bathroom, change a child's diaper, or sneeze  Wash your hands before you prepare or eat food  · Stay away from people who are sick  Some germs spread easily and quickly through contact  Follow up with your doctor as directed: You may be referred to an ear, nose, and throat specialist  Write down your questions so you remember to ask them during your visits  © Copyright [employer] 2021 Information is for End User's use only and may not be sold, redistributed or otherwise used for commercial purposes  All illustrations and images included in CareNotes® are the copyrighted property of [employer]  or Lisa Huerta  The above information is an  only  It is not intended as medical advice for individual conditions or treatments  Talk to your doctor, nurse or pharmacist before following any medical regimen to see if it is safe and effective for you

## 2022-03-05 ENCOUNTER — TELEPHONE (OUTPATIENT)
Dept: FAMILY MEDICINE CLINIC | Facility: CLINIC | Age: 62
End: 2022-03-05

## 2022-03-05 NOTE — TELEPHONE ENCOUNTER
I spoke with patient to clarify what he is asking for  He has been buying his diabetic supplies out of pocket at [facility], but is tired of sticking his fingers and requesting to try the Freestyle Justin 2 CGM  I advised patient that it may require prior authorization and that sometimes the insurance companies don't pay for it  He states he will pay out of pocket for it if we would send it to the pharmacy  He is asking if we could send it prior to Monday  I told him I would send a message to in-office provider, but couldn't make any promises      [facility]

## 2022-03-05 NOTE — TELEPHONE ENCOUNTER
Pt stopped in on 3/5/22 asking for a freestyle Libre2 meter and patches to [facility]  He is leaving for [place] on Monday

## 2022-05-26 ENCOUNTER — TELEPHONE (OUTPATIENT)
Dept: FAMILY MEDICINE CLINIC | Facility: CLINIC | Age: 62
End: 2022-05-26

## 2022-06-13 ENCOUNTER — OFFICE VISIT (OUTPATIENT)
Dept: FAMILY MEDICINE CLINIC | Facility: CLINIC | Age: 62
End: 2022-06-13
Payer: COMMERCIAL

## 2022-06-13 VITALS
SYSTOLIC BLOOD PRESSURE: 136 MMHG | WEIGHT: 160.4 LBS | BODY MASS INDEX: 21.73 KG/M2 | HEIGHT: 72 IN | OXYGEN SATURATION: 97 % | DIASTOLIC BLOOD PRESSURE: 60 MMHG | TEMPERATURE: 99.1 F | HEART RATE: 83 BPM

## 2022-06-13 DIAGNOSIS — E78.2 MIXED HYPERLIPIDEMIA: ICD-10-CM

## 2022-06-13 DIAGNOSIS — F10.10 ALCOHOL ABUSE: ICD-10-CM

## 2022-06-13 DIAGNOSIS — J06.9 UPPER RESPIRATORY TRACT INFECTION, UNSPECIFIED TYPE: Primary | ICD-10-CM

## 2022-06-13 DIAGNOSIS — I10 BENIGN ESSENTIAL HYPERTENSION: ICD-10-CM

## 2022-06-13 DIAGNOSIS — E11.65 UNCONTROLLED TYPE 2 DIABETES MELLITUS WITH HYPERGLYCEMIA (HCC): ICD-10-CM

## 2022-06-13 PROCEDURE — 3725F SCREEN DEPRESSION PERFORMED: CPT | Performed by: NURSE PRACTITIONER

## 2022-06-13 PROCEDURE — 3075F SYST BP GE 130 - 139MM HG: CPT | Performed by: NURSE PRACTITIONER

## 2022-06-13 PROCEDURE — 1036F TOBACCO NON-USER: CPT | Performed by: NURSE PRACTITIONER

## 2022-06-13 PROCEDURE — 3008F BODY MASS INDEX DOCD: CPT | Performed by: NURSE PRACTITIONER

## 2022-06-13 PROCEDURE — U0003 INFECTIOUS AGENT DETECTION BY NUCLEIC ACID (DNA OR RNA); SEVERE ACUTE RESPIRATORY SYNDROME CORONAVIRUS 2 (SARS-COV-2) (CORONAVIRUS DISEASE [COVID-19]), AMPLIFIED PROBE TECHNIQUE, MAKING USE OF HIGH THROUGHPUT TECHNOLOGIES AS DESCRIBED BY CMS-2020-01-R: HCPCS | Performed by: NURSE PRACTITIONER

## 2022-06-13 PROCEDURE — 3078F DIAST BP <80 MM HG: CPT | Performed by: NURSE PRACTITIONER

## 2022-06-13 PROCEDURE — U0005 INFEC AGEN DETEC AMPLI PROBE: HCPCS | Performed by: NURSE PRACTITIONER

## 2022-06-13 PROCEDURE — 99214 OFFICE O/P EST MOD 30 MIN: CPT | Performed by: NURSE PRACTITIONER

## 2022-06-13 RX ORDER — AMOXICILLIN AND CLAVULANATE POTASSIUM 875; 125 MG/1; MG/1
1 TABLET, FILM COATED ORAL EVERY 12 HOURS SCHEDULED
Qty: 14 TABLET | Refills: 0 | Status: SHIPPED | OUTPATIENT
Start: 2022-06-13 | End: 2022-06-20

## 2022-06-13 NOTE — ASSESSMENT & PLAN NOTE
Acute URI; Unclear cause; treat today with abx -as ordered; advised on supportive care; f/u guidance given should sx not improve  Will also test for Covid while in office   Advised to complete Covid vaccine series (has only had one Moderna - last April)

## 2022-06-13 NOTE — PROGRESS NOTES
Former[facility], [facility]    ASSESSMENT AND PLAN     1  Upper respiratory tract infection, unspecified type  Assessment & Plan:  Acute URI; Unclear cause; treat today with abx -as ordered; advised on supportive care; f/u guidance given should sx not improve  Will also test for Covid while in office  Advised to complete Covid vaccine series (has only had one Moderna - last April)      Orders:  -     amoxicillin-clavulanate (Augmentin) 875-125 mg per tablet; Take 1 tablet by mouth every 12 (twelve) hours for 7 days  -     COVID Only - Office Collect    2  Benign essential hypertension  Assessment & Plan:  Appears stable today: 136/60  Admits to stopping preventative Lisinopril - and all of his medications  Strongly encouraged lab work completion and f/u in office - next avaialble  Would advise to restart medications based on current lab results  Orders:  -     Comprehensive metabolic panel; Future    3  Uncontrolled type 2 diabetes mellitus with hyperglycemia [facility])  Assessment & Plan:    Lab Results   Component Value Date    HGBA1C 9 7 (A) 09/09/2021   Likely remains uncontrolled  Pt admits to stopping all hs medications roughly 3-4 months ago - on the advise of his sister who felt he was taking "too many mediications"  Here for an acute visit today  No recent lab work (last in sept 2021, at which time A1c was 9 7)  Fasting orders placed today  He was advised to complete asap and return to review and discuss medications further  Will have staff schedule today while in office  Unsure pt will be compliant - as he too feels he does not need medications over and above the vitamin supplements he is currently taking: Vitamins D and C  Will plan to reeducate on DM and risks/complications with poor glycemic control  Will advise to restart medications based on current lab values  Foot exam completed today as below  Referral today to podiatry  Will recommend DM education for dietary refresher   Needs DM eye exam    Orders:  - Comprehensive metabolic panel; Future  -     HEMOGLOBIN A1C W/ EAG ESTIMATION; Future  -     Ambulatory Referral to Podiatry; Future    4  Mixed hyperlipidemia  -     Lipid panel; Future    5  Alcohol abuse  Assessment & Plan:  Admits to roughly 6 pack daily  Cessation encouraged  Will revisit again at follow up           SUBJECTIVE       Patient ID: Surekha Anthony is a 64 y o  male  Chief Complaint   Patient presents with    Diarrhea     May be from taking too much vitamin C, accompanied by upset stomach    Sinus Problem     Congestion, tx with mucinex       HISTORY OF PRESENT ILLNESS    Symptoms started roughly one week ago  Sinus congestion/pressure, watery eyes, sore throat - scratchy voice  2 days with diarrhea - none since yesterday morning  But still with abd cramping  Some mild nausea  No vomiting  No fever  Only one Covid vaccine last year  Has not tested  Was recently traveling with friend via car        The following portions of the patient's history were reviewed and updated as appropriate: allergies, current medications, past family history, past medical history, past social history, past surgical history, and problem list     REVIEW OF SYSTEMS  Review of Systems   Constitutional: Positive for appetite change  Negative for activity change and fever  HENT: Positive for congestion, postnasal drip, sinus pressure and sore throat  Eyes:        Eyes watery   Respiratory: Negative  Cardiovascular: Negative  Gastrointestinal: Positive for abdominal pain and nausea  Negative for vomiting  Diarrhea: resolved  Genitourinary: Negative  Neurological: Negative  Psychiatric/Behavioral: Negative          OBJECTIVE      VITAL SIGNS  /60 (BP Location: Left arm, Patient Position: Sitting, Cuff Size: Standard)   Pulse 83   Temp 99 1 °F (37 3 °C) (Temporal)   Ht 6' (1 829 m)   Wt 72 8 kg (160 lb 6 4 oz)   SpO2 97%   BMI 21 75 kg/m²     CURRENT MEDICATIONS    Current Outpatient Medications:     amoxicillin-clavulanate (Augmentin) 875-125 mg per tablet, Take 1 tablet by mouth every 12 (twelve) hours for 7 days, Disp: 14 tablet, Rfl: 0    aspirin 81 MG tablet, Take 81 mg by mouth daily  , Disp: , Rfl:     Continuous Blood Gluc  (FreeStyle Justin 14 Day Ulysses) VALENTE, Use 1 Device 2 (two) times a day, Disp: 1 each, Rfl: 0    Continuous Blood Gluc Sensor (FreeStyle Justin 14 Day Sensor) MISC, Use 1 strip every 14 (fourteen) days, Disp: 6 each, Rfl: 1    glucose blood (ONE TOUCH ULTRA TEST) test strip, Test blood sugars 3 times a day, Disp: 100 each, Rfl: 2    HUMALOG KWIKPEN 100 units/mL injection pen, INJECT 5 UNITS UNDER THE SKIN BASED ON CARBOHYDRATE COUNTING AFTER MEALS, Disp: 15 mL, Rfl: 3    Lantus SoloStar 100 units/mL injection pen, INJECT 40 UNITS UNDER THE SKIN DAILY, Disp: 75 mL, Rfl: 3    lisinopril (ZESTRIL) 2 5 mg tablet, Take 1 tablet (2 5 mg total) by mouth daily (Patient not taking: Reported on 6/13/2022), Disp: 30 tablet, Rfl: 5    simvastatin (ZOCOR) 40 mg tablet, Take 1 tablet (40 mg total) by mouth daily (Patient not taking: Reported on 6/13/2022), Disp: 30 tablet, Rfl: 5      PHYSICAL EXAMINATION   Physical Exam  Vitals and nursing note reviewed  Constitutional:       General: He is not in acute distress  Appearance: Normal appearance  He is not ill-appearing  HENT:      Head: Normocephalic  Right Ear: Ear canal and external ear normal  There is impacted cerumen  Left Ear: Ear canal and external ear normal  There is impacted cerumen  Ears:      Comments: Advise Debrox - f/u in office for ear lavage if home treatment ineffective  Nose: Mucosal edema, congestion and rhinorrhea present  Rhinorrhea is clear  Mouth/Throat:      Mouth: Mucous membranes are moist       Pharynx: Posterior oropharyngeal erythema present  Tonsils: No tonsillar exudate  2+ on the right  1+ on the left     Cardiovascular:      Rate and Rhythm: Normal rate and regular rhythm  Pulses: no weak pulses          Dorsalis pedis pulses are 2+ on the right side and 2+ on the left side  Posterior tibial pulses are 2+ on the right side and 2+ on the left side  Pulmonary:      Effort: Pulmonary effort is normal  No respiratory distress  Breath sounds: Normal breath sounds and air entry  Feet:      Right foot:      Skin integrity: Dry skin present  No ulcer, skin breakdown, erythema, warmth or callus  Left foot:      Skin integrity: Dry skin present  No ulcer, skin breakdown, erythema, warmth or callus  Skin:     General: Skin is warm and dry  Neurological:      General: No focal deficit present  Mental Status: He is alert and oriented to person, place, and time  Psychiatric:         Attention and Perception: Attention normal          Mood and Affect: Mood normal          Speech: Speech normal          Behavior: Behavior normal          Thought Content: Thought content normal          Cognition and Memory: Cognition normal          Judgment: Judgment normal            Diabetic Foot Exam    Patient's shoes and socks removed  Right Foot/Ankle   Right Foot Inspection  Skin Exam: skin normal, skin intact and dry skin  No warmth, no callus, no erythema, no maceration, no abnormal color, no pre-ulcer, no ulcer and no callus  Toe Exam: ROM and strength within normal limits  Sensory   Monofilament testing: diminished    Vascular  Capillary refills: < 3 seconds  The right DP pulse is 2+  The right PT pulse is 2+  Left Foot/Ankle  Left Foot Inspection  Skin Exam: skin normal, skin intact and dry skin  No warmth, no erythema, no maceration, normal color, no pre-ulcer, no ulcer and no callus  Toe Exam: ROM and strength within normal limits  Sensory   Monofilament testing: diminished    Vascular  Capillary refills: < 3 seconds  The left DP pulse is 2+  The left PT pulse is 2+       Assign Risk Category  No deformity present  Loss of protective sensation  No weak pulses  Risk: 1

## 2022-06-13 NOTE — ASSESSMENT & PLAN NOTE
Lab Results   Component Value Date    HGBA1C 9 7 (A) 09/09/2021   Likely remains uncontrolled  Pt admits to stopping all hs medications roughly 3-4 months ago - on the advise of his sister who felt he was taking "too many mediications"  Here for an acute visit today  No recent lab work (last in sept 2021, at which time A1c was 9 7)  Fasting orders placed today  He was advised to complete asap and return to review and discuss medications further  Will have staff schedule today while in office  Unsure pt will be compliant - as he too feels he does not need medications over and above the vitamin supplements he is currently taking: Vitamins D and C  Will plan to reeducate on DM and risks/complications with poor glycemic control  Will advise to restart medications based on current lab values  Foot exam completed today as below  Referral today to podiatry  Will recommend DM education for dietary refresher   Needs DM eye exam

## 2022-06-13 NOTE — ASSESSMENT & PLAN NOTE
Appears stable today: 136/60  Admits to stopping preventative Lisinopril - and all of his medications  Strongly encouraged lab work completion and f/u in office - next avaialble  Would advise to restart medications based on current lab results

## 2022-06-14 ENCOUNTER — TELEPHONE (OUTPATIENT)
Dept: FAMILY MEDICINE CLINIC | Facility: CLINIC | Age: 62
End: 2022-06-14

## 2022-06-14 LAB — SARS-COV-2 RNA RESP QL NAA+PROBE: NEGATIVE

## 2022-07-18 DIAGNOSIS — Z79.4 TYPE 2 DIABETES MELLITUS WITH HYPERGLYCEMIA, WITH LONG-TERM CURRENT USE OF INSULIN (HCC): ICD-10-CM

## 2022-07-18 DIAGNOSIS — E11.65 TYPE 2 DIABETES MELLITUS WITH HYPERGLYCEMIA, WITH LONG-TERM CURRENT USE OF INSULIN (HCC): ICD-10-CM

## 2022-07-18 DIAGNOSIS — E11.65 UNCONTROLLED TYPE 2 DIABETES MELLITUS WITH HYPERGLYCEMIA (HCC): ICD-10-CM

## 2022-07-19 DIAGNOSIS — E11.65 UNCONTROLLED TYPE 2 DIABETES MELLITUS WITH HYPERGLYCEMIA (HCC): ICD-10-CM

## 2022-07-19 RX ORDER — INSULIN GLARGINE-YFGN 100 [IU]/ML
40 INJECTION, SOLUTION SUBCUTANEOUS DAILY
Qty: 75 ML | Refills: 3 | Status: SHIPPED | OUTPATIENT
Start: 2022-07-19

## 2022-07-19 RX ORDER — INSULIN LISPRO 100 [IU]/ML
INJECTION, SOLUTION INTRAVENOUS; SUBCUTANEOUS
Qty: 15 ML | Refills: 3 | Status: SHIPPED | OUTPATIENT
Start: 2022-07-19

## 2022-07-19 RX ORDER — INSULIN GLARGINE 100 [IU]/ML
INJECTION, SOLUTION SUBCUTANEOUS
Qty: 75 ML | Refills: 3 | Status: SHIPPED | OUTPATIENT
Start: 2022-07-19 | End: 2022-07-19

## 2022-08-01 ENCOUNTER — RA CDI HCC (OUTPATIENT)
Dept: OTHER | Facility: HOSPITAL | Age: 62
End: 2022-08-01

## 2022-08-01 NOTE — PROGRESS NOTES
[facility], [place] [facility]  coding opportunities          Chart Reviewed number of suggestions sent to Provider: 1   [id number] see last eye exam on file 5/24/18 in media tab        Patients Insurance        Commercial Insurance: [employer]

## 2022-08-08 ENCOUNTER — OFFICE VISIT (OUTPATIENT)
Dept: FAMILY MEDICINE CLINIC | Facility: CLINIC | Age: 62
End: 2022-08-08
Payer: COMMERCIAL

## 2022-08-08 VITALS
TEMPERATURE: 97.7 F | OXYGEN SATURATION: 96 % | SYSTOLIC BLOOD PRESSURE: 120 MMHG | RESPIRATION RATE: 16 BRPM | HEART RATE: 99 BPM | HEIGHT: 72 IN | BODY MASS INDEX: 21.4 KG/M2 | WEIGHT: 158 LBS | DIASTOLIC BLOOD PRESSURE: 70 MMHG

## 2022-08-08 DIAGNOSIS — E78.2 MIXED HYPERLIPIDEMIA: ICD-10-CM

## 2022-08-08 DIAGNOSIS — E10.65 TYPE 1 DIABETES MELLITUS WITH HYPERGLYCEMIA (HCC): ICD-10-CM

## 2022-08-08 DIAGNOSIS — F10.10 ALCOHOL ABUSE: ICD-10-CM

## 2022-08-08 DIAGNOSIS — Z00.00 WELL ADULT EXAM: Primary | ICD-10-CM

## 2022-08-08 DIAGNOSIS — I10 BENIGN ESSENTIAL HYPERTENSION: ICD-10-CM

## 2022-08-08 DIAGNOSIS — K21.9 GASTROESOPHAGEAL REFLUX DISEASE WITHOUT ESOPHAGITIS: ICD-10-CM

## 2022-08-08 DIAGNOSIS — R17 ELEVATED BILIRUBIN: ICD-10-CM

## 2022-08-08 PROCEDURE — 3078F DIAST BP <80 MM HG: CPT | Performed by: FAMILY MEDICINE

## 2022-08-08 PROCEDURE — 3074F SYST BP LT 130 MM HG: CPT | Performed by: FAMILY MEDICINE

## 2022-08-08 PROCEDURE — 99396 PREV VISIT EST AGE 40-64: CPT | Performed by: FAMILY MEDICINE

## 2022-08-08 NOTE — ASSESSMENT & PLAN NOTE
History of mildly elevated bilirubin  Patient had ultrasound of liver March 2021 which was negative  He is due for repeat blood work  He does not exhibit any clinical jaundice today    I encouraged him to get this done

## 2022-08-08 NOTE — PROGRESS NOTES
[facility]      NAME: Dotty Gonsalez  AGE: 64 y o  SEX: male  : 1960   MRN: [id number]    DATE: 2022  TIME: 2:51 PM    Assessment and Plan     Problem List Items Addressed This Visit     Benign essential hypertension     Blood pressure well controlled on lisinopril 2 5 mg daily         Mixed hyperlipidemia     Recommend continue simvastatin 40         Alcohol abuse     Patient states he has cut back on his drinking  He is now drinking a few beers per week  Gastroesophageal reflux disease without esophagitis     Continue OTC omeprazole p r n  Type 1 diabetes mellitus with hyperglycemia (HCC)       Lab Results   Component Value Date    HGBA1C 9 7 (A) 2021   History of insulin requiring diabetes for the past 10 years  Patient states he is currently taking basal insulin 30 units daily and NovoLog sliding scale  He is overdue for labs  I encouraged him to get these done in near future  He was referred to endocrinology in the past, but did not make appointment  I encouraged him to do so  Will place another referral order today  Relevant Orders    Ambulatory Referral to Endocrinology    Elevated bilirubin     History of mildly elevated bilirubin  Patient had ultrasound of liver 2021 which was negative  He is due for repeat blood work  He does not exhibit any clinical jaundice today  I encouraged him to get this done           Other Visit Diagnoses     Well adult exam    -  Primary      Patient presents for 49-year-old physical examination today  Overall he feels well  He is a nonsmoker  He has cut back on his alcohol consumption  He now only drinks a few beers per week  Drinks approximately 1 cup of coffee per day  He states his diet is poor, he eats at fast food often  No formal exercise, but states he is very active  He is compliant with prescribed medications  Denies any significant hypoglycemia    His exam today is essentially unremarkable  I encouraged him to get blood work done in near future  Patient is due for fasting blood work (CMP, A1c, lipids)  I encouraged him to get this done  Will call with results    Patient current with colonoscopy  Patient had 1st COVID shot  I encouraged him to schedule 2nd  Last tetanus booster 2013    Return to office in:  6 months (most labs ordered by endocrinology)    Chief Complaint     Chief Complaint   Patient presents with    Physical Exam       History of Present Illness     Patient presents for 59-year-old physical examination today  Overall he feels well  He is a nonsmoker  He has cut back on his alcohol consumption  He now only drinks a few beers per week  Drinks approximately 1 cup of coffee per day  He states his diet is poor, he eats at fast food often  No formal exercise, but states he is very active  He is compliant with prescribed medications  Denies any significant hypoglycemia  The following portions of the patient's history were reviewed and updated as appropriate: allergies, current medications, past family history, past medical history, past social history, past surgical history and problem list     Review of Systems   Review of Systems   Respiratory: Negative  Cardiovascular: Negative  Gastrointestinal: Negative  Genitourinary: Negative          Active Problem List     Patient Active Problem List   Diagnosis    Benign essential hypertension    Mixed hyperlipidemia    Alcohol abuse    Gastroesophageal reflux disease without esophagitis    Type 1 diabetes mellitus with hyperglycemia (HCC)    Elevated bilirubin    Upper respiratory tract infection       Objective   /70 (BP Location: Left arm, Patient Position: Sitting, Cuff Size: Adult)   Pulse 99   Temp 97 7 °F (36 5 °C) (Temporal)   Resp 16   Ht 5' 11 65" (1 82 m)   Wt 71 7 kg (158 lb)   SpO2 96%   BMI 21 64 kg/m²     Physical Exam  Cardiovascular:      Rate and Rhythm: Normal rate and regular rhythm  Heart sounds: Normal heart sounds  Comments: Carotids: no bruits  Ext: no edema  Pulmonary:      Effort: Pulmonary effort is normal  No respiratory distress  Breath sounds: No wheezing or rales  Psychiatric:         Behavior: Behavior normal          Thought Content:  Thought content normal          Pertinent Laboratory/Diagnostic Studies:  Last labs reviewed    Current Medications     Current Outpatient Medications:     aspirin 81 MG tablet, Take 81 mg by mouth daily  , Disp: , Rfl:     Continuous Blood Gluc  (FreeStyle Justin 14 Day [place]y) VALENTE, Use 1 Device 2 (two) times a day, Disp: 1 each, Rfl: 0    Continuous Blood Gluc Sensor (FreeStyle Justin 14 Day Sensor) MISC, Use 1 strip every 14 (fourteen) days, Disp: 6 each, Rfl: 1    glucose blood (ONE TOUCH ULTRA TEST) test strip, Test blood sugars 3 times a day, Disp: 100 each, Rfl: 2    Insulin Glargine-yfgn (Semglee, yfgn,) 100 UNIT/ML SOPN, Inject 40 Units under the skin in the morning, Disp: 75 mL, Rfl: 3    insulin lispro (HumaLOG KwikPen) 100 units/mL injection pen, Inject 5 units under the skin based on carbohydrate counting after meals, Disp: 15 mL, Rfl: 3    lisinopril (ZESTRIL) 2 5 mg tablet, Take 1 tablet (2 5 mg total) by mouth daily, Disp: 30 tablet, Rfl: 5    simvastatin (ZOCOR) 40 mg tablet, Take 1 tablet (40 mg total) by mouth daily, Disp: 30 tablet, Rfl: 5    Health Maintenance     Health Maintenance   Topic Date Due    Pneumococcal Vaccine: Pediatrics (0 to 5 Years) and At-Risk Patients (6 to 64 Years) (2 - PCV) 12/13/2013    DM Eye Exam  09/24/2019    COVID-19 Vaccine (2 - Moderna series) 05/14/2021    HEMOGLOBIN A1C  03/09/2022    Influenza Vaccine (1) 09/01/2022    Depression Screening  06/13/2023    Diabetic Foot Exam  06/13/2023    DTaP,Tdap,and Td Vaccines (2 - Td or Tdap) 06/25/2023    BMI: Adult  08/08/2023    Annual Physical  08/08/2023    Colorectal Cancer Screening  11/11/2024  Hepatitis C Screening  Completed    HIV Screening  Addressed    HIB Vaccine  Aged Out    Hepatitis B Vaccine  Aged Out    IPV Vaccine  Aged Out    Hepatitis A Vaccine  Aged Out    Meningococcal ACWY Vaccine  Aged Out    HPV Vaccine  Aged Out     Immunization History   Administered Date(s) Administered    COVID-19 MODERNA VACC 0 5 ML IM 04/16/2021    Influenza Quadrivalent, 6-35 Months IM 10/04/2017    Influenza, recombinant, quadrivalent,injectable, preservative free 09/24/2018, 12/13/2019    Influenza, seasonal, injectable 12/13/2012    Pneumococcal Polysaccharide PPV23 12/13/2012    Tdap 06/25/2013       Veto Champagne DO  [facility]

## 2022-08-08 NOTE — ASSESSMENT & PLAN NOTE
Lab Results   Component Value Date    HGBA1C 9 7 (A) 09/09/2021   History of insulin requiring diabetes for the past 10 years  Patient states he is currently taking basal insulin 30 units daily and NovoLog sliding scale  He is overdue for labs  I encouraged him to get these done in near future  He was referred to endocrinology in the past, but did not make appointment  I encouraged him to do so  Will place another referral order today

## 2022-09-24 ENCOUNTER — APPOINTMENT (OUTPATIENT)
Dept: LAB | Facility: CLINIC | Age: 62
End: 2022-09-24
Payer: COMMERCIAL

## 2022-09-24 DIAGNOSIS — I10 BENIGN ESSENTIAL HYPERTENSION: ICD-10-CM

## 2022-09-24 DIAGNOSIS — E11.65 UNCONTROLLED TYPE 2 DIABETES MELLITUS WITH HYPERGLYCEMIA (HCC): ICD-10-CM

## 2022-09-24 DIAGNOSIS — E78.2 MIXED HYPERLIPIDEMIA: ICD-10-CM

## 2022-09-24 LAB
ALBUMIN SERPL BCP-MCNC: 3.6 G/DL (ref 3.5–5)
ALP SERPL-CCNC: 77 U/L (ref 46–116)
ALT SERPL W P-5'-P-CCNC: 23 U/L (ref 12–78)
ANION GAP SERPL CALCULATED.3IONS-SCNC: 7 MMOL/L (ref 4–13)
AST SERPL W P-5'-P-CCNC: 8 U/L (ref 5–45)
BILIRUB SERPL-MCNC: 1.49 MG/DL (ref 0.2–1)
BUN SERPL-MCNC: 10 MG/DL (ref 5–25)
CALCIUM SERPL-MCNC: 8.6 MG/DL (ref 8.3–10.1)
CHLORIDE SERPL-SCNC: 103 MMOL/L (ref 96–108)
CHOLEST SERPL-MCNC: 153 MG/DL
CO2 SERPL-SCNC: 28 MMOL/L (ref 21–32)
CREAT SERPL-MCNC: 0.75 MG/DL (ref 0.6–1.3)
EST. AVERAGE GLUCOSE BLD GHB EST-MCNC: 186 MG/DL
GFR SERPL CREATININE-BSD FRML MDRD: 99 ML/MIN/1.73SQ M
GLUCOSE P FAST SERPL-MCNC: 164 MG/DL (ref 65–99)
HBA1C MFR BLD: 8.1 %
HDLC SERPL-MCNC: 41 MG/DL
LDLC SERPL CALC-MCNC: 95 MG/DL (ref 0–100)
NONHDLC SERPL-MCNC: 112 MG/DL
POTASSIUM SERPL-SCNC: 4.1 MMOL/L (ref 3.5–5.3)
PROT SERPL-MCNC: 7.2 G/DL (ref 6.4–8.4)
SODIUM SERPL-SCNC: 138 MMOL/L (ref 135–147)
TRIGL SERPL-MCNC: 85 MG/DL

## 2022-09-24 PROCEDURE — 3052F HG A1C>EQUAL 8.0%<EQUAL 9.0%: CPT | Performed by: FAMILY MEDICINE

## 2022-09-24 PROCEDURE — 80053 COMPREHEN METABOLIC PANEL: CPT

## 2022-09-24 PROCEDURE — 83036 HEMOGLOBIN GLYCOSYLATED A1C: CPT

## 2022-09-24 PROCEDURE — 80061 LIPID PANEL: CPT

## 2022-09-24 PROCEDURE — 36415 COLL VENOUS BLD VENIPUNCTURE: CPT

## 2022-09-27 ENCOUNTER — TELEPHONE (OUTPATIENT)
Dept: FAMILY MEDICINE CLINIC | Facility: CLINIC | Age: 62
End: 2022-09-27

## 2022-09-27 NOTE — TELEPHONE ENCOUNTER
----- Message from Almas Patricia DO sent at 9/26/2022 10:46 AM EDT -----  Call patient, recommend schedule appointment with 1 of her providers in the near future (within the next month)

## 2022-10-04 ENCOUNTER — OFFICE VISIT (OUTPATIENT)
Dept: FAMILY MEDICINE CLINIC | Facility: CLINIC | Age: 62
End: 2022-10-04
Payer: COMMERCIAL

## 2022-10-04 VITALS
OXYGEN SATURATION: 99 % | SYSTOLIC BLOOD PRESSURE: 140 MMHG | BODY MASS INDEX: 23.1 KG/M2 | DIASTOLIC BLOOD PRESSURE: 68 MMHG | HEART RATE: 88 BPM | HEIGHT: 71 IN | WEIGHT: 165 LBS | TEMPERATURE: 97 F

## 2022-10-04 DIAGNOSIS — B35.4 TINEA CORPORIS: ICD-10-CM

## 2022-10-04 DIAGNOSIS — L03.032 PARONYCHIA OF GREAT TOE OF LEFT FOOT: Primary | ICD-10-CM

## 2022-10-04 PROCEDURE — 99213 OFFICE O/P EST LOW 20 MIN: CPT | Performed by: FAMILY MEDICINE

## 2022-10-04 RX ORDER — CEPHALEXIN 500 MG/1
500 CAPSULE ORAL EVERY 6 HOURS SCHEDULED
Qty: 28 CAPSULE | Refills: 0 | Status: SHIPPED | OUTPATIENT
Start: 2022-10-04 | End: 2022-10-11

## 2022-10-04 RX ORDER — KETOCONAZOLE 20 MG/G
CREAM TOPICAL DAILY
Qty: 15 G | Refills: 0 | Status: SHIPPED | OUTPATIENT
Start: 2022-10-04 | End: 2022-10-11

## 2022-10-04 NOTE — ASSESSMENT & PLAN NOTE
Advised abx and f/u with podiatry given hx of DM; reviewed warm soaks; f/u guidance given should sx worsen

## 2022-10-04 NOTE — PATIENT INSTRUCTIONS
Complete antibiotics and follow up with podiatry  Use cream on arm for 1 week  Follow up if no improvement or if any worsening symptoms

## 2022-10-04 NOTE — PROGRESS NOTES
Assessment/Plan:     1  Paronychia of great toe of left foot  Assessment & Plan:  Advised abx and f/u with podiatry given hx of DM; reviewed warm soaks; f/u guidance given should sx worsen    Orders:  -     cephalexin (KEFLEX) 500 mg capsule; Take 1 capsule (500 mg total) by mouth every 6 (six) hours for 7 days  -     Ambulatory Referral to Podiatry; Future    2  Tinea corporis  Assessment & Plan:  Rash consistent with tinea on appearance; advised antifungal as ordered; f/u if no improvement    Orders:  -     ketoconazole (NIZORAL) 2 % cream; Apply topically daily for 7 days        Subjective:      Patient ID: Samira Esparza is a 64 y o  male  Noticed last night skin around nail was red and skin peeling around nail  Seemed red and skin was peeling  Did have some bleeding unsure if was related to his nail breaking and pulling the nail back  No fevers  Does have history of diabetes  No pain  Lab Results       Component                Value               Date                       SODIUM                   138                 09/24/2022                 K                        4 1                 09/24/2022                 CL                       103                 09/24/2022                 CO2                      28                  09/24/2022                 BUN                      10                  09/24/2022                 CREATININE               0 75                09/24/2022                 GLUC                     83                  05/29/2018                 CALCIUM                  8 6                 09/24/2022                  The following portions of the patient's history were reviewed and updated as appropriate: allergies, current medications, past family history, past medical history, past social history, past surgical history, and problem list     Review of Systems   Constitutional: Negative for chills and fever  Respiratory: Negative for shortness of breath      Cardiovascular: Negative for chest pain  Skin: Positive for wound  Objective:      /68   Pulse 88   Temp (!) 97 °F (36 1 °C)   Ht 5' 11" (1 803 m)   Wt 74 8 kg (165 lb)   SpO2 99%   BMI 23 01 kg/m²          Physical Exam  Vitals reviewed  Constitutional:       General: He is not in acute distress  Appearance: Normal appearance  He is not ill-appearing, toxic-appearing or diaphoretic  HENT:      Head: Normocephalic and atraumatic  Eyes:      General: No scleral icterus  Right eye: No discharge  Left eye: No discharge  Conjunctiva/sclera: Conjunctivae normal    Cardiovascular:      Rate and Rhythm: Normal rate and regular rhythm  Pulses: Normal pulses  Heart sounds: Normal heart sounds  No murmur heard  No gallop  Pulmonary:      Effort: Pulmonary effort is normal  No respiratory distress  Breath sounds: Normal breath sounds  No stridor  No wheezing, rhonchi or rales  Musculoskeletal:      Right lower leg: No edema  Left lower leg: No edema  Skin:         Neurological:      General: No focal deficit present  Mental Status: He is alert and oriented to person, place, and time  Psychiatric:         Mood and Affect: Mood normal          Behavior: Behavior normal          Thought Content:  Thought content normal          Judgment: Judgment normal

## 2022-10-05 ENCOUNTER — OFFICE VISIT (OUTPATIENT)
Dept: PODIATRY | Facility: CLINIC | Age: 62
End: 2022-10-05
Payer: COMMERCIAL

## 2022-10-05 VITALS — OXYGEN SATURATION: 98 % | WEIGHT: 165 LBS | HEIGHT: 71 IN | BODY MASS INDEX: 23.1 KG/M2 | HEART RATE: 72 BPM

## 2022-10-05 DIAGNOSIS — L03.032 PARONYCHIA OF GREAT TOE OF LEFT FOOT: ICD-10-CM

## 2022-10-05 DIAGNOSIS — L60.0 INGROWN LEFT BIG TOENAIL: ICD-10-CM

## 2022-10-05 DIAGNOSIS — M79.675 TOE PAIN, LEFT: Primary | ICD-10-CM

## 2022-10-05 PROCEDURE — 11730 AVULSION NAIL PLATE SIMPLE 1: CPT | Performed by: STUDENT IN AN ORGANIZED HEALTH CARE EDUCATION/TRAINING PROGRAM

## 2022-10-05 PROCEDURE — 99203 OFFICE O/P NEW LOW 30 MIN: CPT | Performed by: STUDENT IN AN ORGANIZED HEALTH CARE EDUCATION/TRAINING PROGRAM

## 2022-10-05 RX ORDER — LIDOCAINE HYDROCHLORIDE 10 MG/ML
3 INJECTION, SOLUTION INFILTRATION; PERINEURAL ONCE
Status: COMPLETED | OUTPATIENT
Start: 2022-10-05 | End: 2022-10-05

## 2022-10-05 RX ADMIN — LIDOCAINE HYDROCHLORIDE 3 ML: 10 INJECTION, SOLUTION INFILTRATION; PERINEURAL at 11:37

## 2022-10-06 NOTE — PROGRESS NOTES
Assessment/Plan:    No problem-specific Assessment & Plan notes found for this encounter  Diagnoses and all orders for this visit:    Toe pain, left  -     lidocaine (XYLOCAINE) 1 % injection 3 mL    Ingrown left big toenail  -     lidocaine (XYLOCAINE) 1 % injection 3 mL  -     Nail removal      Plan:     - Patient presents with ingrown toenail on the medial border of (left) hallux  Partial toenail avulsion procedure was performed as detailed below  I discussed healing complication post nail avulsion with pt, he expresses understanding       - The procedure, anesthesia, complications and alternative care were explained in great detail  No warranties or guarantees were given as to the outcome of the procedure  Verbal consent was obtained from the patient  3cc of 1% lidocaine plain was injected in to the left hallux following sterile alcohol prep  The toe was prepped in sterile fashion  A tourniquet was applied to the hallux for hemostasis  Under sterile conditions the partial nail plate was removed  The tourniquet was removed after verifying all the pathologic nail tissue was removed  A dry sterile dressing with antibiotic ointment was applied to the surgical site  Home care instructions were given to the patient including decreased activity, ice and OTC pain medication as needed for 3 days  Patient will also perform daily dressing changes until the surgical site is fully healed  Patient tolerated the procedure well and with no complications  - Monitor for infection: pus (thick yellow drainage), redness tracking up the foot, fever, nausea, vomiting, fever, chills  If any of these issues arise, call clinic immediately or go to urgent care or emergency room to see provider     - The patient will return in 10 days for follow-up        - Continue taking abx Keflex as prescribed, PCP notes reviewed     Lab Results   Component Value Date    HGBA1C 8 1 (H) 09/24/2022         Subjective:      Patient ID: Marixa Whitmore is a 64 y o  male  70-year-old male with past medical history of type 2 diabetes presents with complaint of left hallux paronychia and ingrown toenail  Patient reports he is noticed ingrown nail since about 2 weeks now  He was evaluated at PCP who recommended podiatry follow-up and was placed on Keflex  Patient reports he has been taking antibiotics  Reports he has pain with pressure specifically wearing shoes and socks  He also noted some drainage from the ingrown nail site  No other complaints at this time  Denies nausea vomiting fever chills shortness of breath  The following portions of the patient's history were reviewed and updated as appropriate:   He  has a past medical history of Alcohol abuse (12/13/2019), Diabetes mellitus ([facility] ), Gastroesophageal reflux disease without esophagitis (1/13/2020), Hypertension, and Type 2 diabetes mellitus ([facility] )  He   Patient Active Problem List    Diagnosis Date Noted    Paronychia of great toe of left foot 10/04/2022    Tinea corporis 10/04/2022    Upper respiratory tract infection 06/13/2022    Elevated bilirubin 03/08/2021    Type 1 diabetes mellitus with hyperglycemia ([facility] ) 03/03/2020    Gastroesophageal reflux disease without esophagitis 01/13/2020    Alcohol abuse 12/13/2019    Benign essential hypertension 09/10/2012    Mixed hyperlipidemia 09/10/2012     He  has a past surgical history that includes No past surgeries       Review of Systems   Constitutional: Negative for chills  Respiratory: Negative for shortness of breath  Gastrointestinal: Negative for diarrhea  Musculoskeletal: Positive for arthralgias  Skin: Positive for color change  Neurological: Negative for dizziness  Psychiatric/Behavioral: Negative for agitation  Objective:      Pulse 72   Ht 5' 11" (1 803 m)   Wt 74 8 kg (165 lb)   SpO2 98%   BMI 23 01 kg/m²          Physical Exam  Vitals reviewed     Cardiovascular:      Rate and Rhythm: Normal rate  Pulses: Normal pulses  Dorsalis pedis pulses are 2+ on the right side and 2+ on the left side  Posterior tibial pulses are 2+ on the right side and 2+ on the left side  Pulmonary:      Effort: Pulmonary effort is normal    Musculoskeletal:         General: Tenderness present  Feet:      Left foot:      Toenail Condition: Left toenails are ingrown  Comments: Left hallux ingrown nail noted on the medial border  Nail plate appeared to be incurvated into the medial nail margin  Pain with palpation  Crusted drainage noted to the nail margin  Skin:     General: Skin is warm  Neurological:      General: No focal deficit present  Mental Status: He is alert  Psychiatric:         Mood and Affect: Mood normal        Nail removal    Date/Time: 10/5/2022 8:33 PM  Performed by: Gillian Ojeda DPM  Authorized by: Gillian Ojeda DPM     Patient location:  ClinicUniversal Protocol:  Consent: Verbal consent obtained  Risks and benefits: risks, benefits and alternatives were discussed  Consent given by: patient  Patient understanding: patient states understanding of the procedure being performed  Patient identity confirmed: verbally with patient      Location:     Foot:  L big toe  Pre-procedure details:     Skin preparation:  Betadine and alcohol  Anesthesia (see MAR for exact dosages): Anesthesia method:  Local infiltration    Local anesthetic:  Lidocaine 1% w/o epi (3cc)  Nail Removal:     Nail removed:  Partial    Nail side:  Medial  Post-procedure details:     Dressing:  4x4 sterile gauze, antibiotic ointment and gauze roll    Patient tolerance of procedure:   Tolerated well, no immediate complications

## 2023-01-09 DIAGNOSIS — E11.65 TYPE 2 DIABETES MELLITUS WITH HYPERGLYCEMIA, WITH LONG-TERM CURRENT USE OF INSULIN (HCC): ICD-10-CM

## 2023-01-09 DIAGNOSIS — Z79.4 TYPE 2 DIABETES MELLITUS WITH HYPERGLYCEMIA, WITH LONG-TERM CURRENT USE OF INSULIN (HCC): ICD-10-CM

## 2023-01-09 RX ORDER — INSULIN LISPRO 100 [IU]/ML
INJECTION, SOLUTION INTRAVENOUS; SUBCUTANEOUS
Qty: 15 ML | Refills: 3 | Status: SHIPPED | OUTPATIENT
Start: 2023-01-09

## 2023-01-09 NOTE — TELEPHONE ENCOUNTER
Patient is calling for a refill of his humalog sent to the [facility] in [place]

## 2023-02-02 ENCOUNTER — RA CDI HCC (OUTPATIENT)
Dept: OTHER | Facility: HOSPITAL | Age: 63
End: 2023-02-02

## 2023-02-02 NOTE — PROGRESS NOTES
[facility]  coding opportunities          Chart Reviewed number of suggestions sent to Provider: [phone]      Patients Insurance        Commercial Insurance: [employer]

## 2023-02-09 ENCOUNTER — OFFICE VISIT (OUTPATIENT)
Dept: FAMILY MEDICINE CLINIC | Facility: CLINIC | Age: 63
End: 2023-02-09

## 2023-02-09 VITALS
HEIGHT: 71 IN | SYSTOLIC BLOOD PRESSURE: 110 MMHG | DIASTOLIC BLOOD PRESSURE: 70 MMHG | BODY MASS INDEX: 22.68 KG/M2 | HEART RATE: 97 BPM | RESPIRATION RATE: 16 BRPM | OXYGEN SATURATION: 100 % | TEMPERATURE: 97.3 F | WEIGHT: 162 LBS

## 2023-02-09 DIAGNOSIS — E78.2 MIXED HYPERLIPIDEMIA: Primary | ICD-10-CM

## 2023-02-09 DIAGNOSIS — R17 ELEVATED BILIRUBIN: ICD-10-CM

## 2023-02-09 DIAGNOSIS — F10.10 ALCOHOL ABUSE: ICD-10-CM

## 2023-02-09 DIAGNOSIS — E11.65 TYPE 2 DIABETES MELLITUS WITH HYPERGLYCEMIA, WITH LONG-TERM CURRENT USE OF INSULIN (HCC): ICD-10-CM

## 2023-02-09 DIAGNOSIS — Z12.5 SCREENING FOR PROSTATE CANCER: ICD-10-CM

## 2023-02-09 DIAGNOSIS — Z79.4 TYPE 2 DIABETES MELLITUS WITH HYPERGLYCEMIA, WITH LONG-TERM CURRENT USE OF INSULIN (HCC): ICD-10-CM

## 2023-02-09 DIAGNOSIS — I10 BENIGN ESSENTIAL HYPERTENSION: ICD-10-CM

## 2023-02-09 PROBLEM — E10.65 TYPE 1 DIABETES MELLITUS WITH HYPERGLYCEMIA (HCC): Status: RESOLVED | Noted: 2020-03-03 | Resolved: 2023-02-09

## 2023-02-09 LAB — SL AMB POCT HEMOGLOBIN AIC: 8.5 (ref ?–6.5)

## 2023-02-09 RX ORDER — INSULIN LISPRO 100 [IU]/ML
INJECTION, SOLUTION INTRAVENOUS; SUBCUTANEOUS
Qty: 15 ML | Refills: 3 | Status: SHIPPED | OUTPATIENT
Start: 2023-02-09

## 2023-02-09 NOTE — ASSESSMENT & PLAN NOTE
Patient states he continues to cut back on his alcohol intake  Was drinking more than a sixpack of beer nightly  He is now drinking 2 beers nightly (total 24 ounces)    Counseled again

## 2023-02-09 NOTE — PROGRESS NOTES
Name: Vergil Najjar      : 1960      MRN: [id number]  Encounter Provider: Linda Vázquez DO  Encounter Date: 2023   Encounter department: [address] [place] [address]  Mixed hyperlipidemia  Assessment & Plan:  Cholesterol from  was 153, LDL 95  Doing well on simvastatin 40      2  Type 2 diabetes mellitus with hyperglycemia, with long-term current use of insulin [facility])  Assessment & Plan:    Lab Results   Component Value Date    HGBA1C 8 1 (H) 2022   Initially diagnosed in   For the past few months, patient has not been using basal insulin due to cost    He is only using NovoLog sliding scale with meals (averaging 13 units per dose)  Denies any hypoglycemia  Rapid A1c today 8 5%  Recommend adjusting insulin +2 units/dose  He was also encouraged to keep ready source of glucose handy at all times  We will recheck labs in 3 months followed by appointment  He was referred to endocrinology in the past, but he has not scheduled appointments  We will continue to manage his condition, but if A1c worsens, will again recommend endocrine input  Orders:  -     insulin lispro (HumaLOG KwikPen) 100 units/mL injection pen; Inject 15 units under the skin based on carbohydrate counting after meals  -     Comprehensive metabolic panel; Future; Expected date: 2023  -     Hemoglobin A1C; Future; Expected date: 2023  -     Microalbumin / creatinine urine ratio; Future; Expected date: 2023  -     POCT hemoglobin A1c    3  Elevated bilirubin  Assessment & Plan:  History of stable elevation of bilirubin  Most recently from  0 49  We will continue to monitor      4  Benign essential hypertension  Assessment & Plan:  Blood pressure well controlled on lisinopril 2 5 mg daily    Orders:  -     CBC and differential; Future; Expected date: 2023  -     TSH, 3rd generation with Free T4 reflex; Future; Expected date: 2023    5  Alcohol abuse  Assessment & Plan:  Patient states he continues to cut back on his alcohol intake  Was drinking more than a sixpack of beer nightly  He is now drinking 2 beers nightly (total 24 ounces)  Counseled again      6  Screening for prostate cancer  -     PSA, Total Screen; Future; Expected date: 05/09/2023         Patient had colonoscopy in 2021 (1 polyp)  Next due 2024    Patient had [place] vaccination  Patient had Pneumovax  Last tetanus 2013    3 months, fasting blood work prior  Subjective     Patient presents for recheck of chronic medical problems  Due to insurance problems, he has not been using his basal insulin lately  Only using NovoLog mealtime based on sliding scale  Denies any hypoglycemia  Compliant with other medications  Last labs September 24    Review of Systems   Respiratory: Negative  Cardiovascular: Negative  Gastrointestinal: Negative  Genitourinary: Negative  Past Medical History:   Diagnosis Date   • Alcohol abuse 12/13/2019   • Diabetes mellitus ([facility] )     IDDM; blood glucose was 200 today at 0600   • Gastroesophageal reflux disease without esophagitis 1/13/2020   • Hypertension    • Type 2 diabetes mellitus ([facility] )     with Hyperglycemia, Last assessed: 8/11/15     Past Surgical History:   Procedure Laterality Date   • NO PAST SURGERIES       Family History   Problem Relation Age of Onset   • No Known Problems Mother    • Stroke Father    • No Known Problems Sister    • No Known Problems Brother    • Diabetes Paternal Aunt    • Diabetes Paternal Uncle      Social History     Socioeconomic History   • Marital status: Single     Spouse name: None   • Number of children: None   • Years of education: None   • Highest education level: None   Occupational History   • Occupation: retired   Tobacco Use   • Smoking status: Never   • Smokeless tobacco: Never   Vaping Use   • Vaping Use: Never used   Substance and Sexual Activity   • Alcohol use:  Yes     Alcohol/week: 42 0 standard drinks     Types: 42 Cans of beer per week   • Drug use: No   • Sexual activity: Not Currently     Partners: Female   Other Topics Concern   • None   Social History Narrative   • None     Social Determinants of Health     Financial Resource Strain: Not on file   Food Insecurity: Not on file   Transportation Needs: Not on file   Physical Activity: Not on file   Stress: Not on file   Social Connections: Not on file   Intimate Partner Violence: Not on file   Housing Stability: Not on file     Current Outpatient Medications on File Prior to Visit   Medication Sig   • aspirin 81 MG tablet Take 81 mg by mouth daily     • glucose blood (ONE TOUCH ULTRA TEST) test strip Test blood sugars 3 times a day   • Insulin Glargine-yfgn (Semglee, yfgn,) 100 UNIT/ML SOPN Inject 40 Units under the skin in the morning   • lisinopril (ZESTRIL) 2 5 mg tablet Take 1 tablet (2 5 mg total) by mouth daily   • simvastatin (ZOCOR) 40 mg tablet Take 1 tablet (40 mg total) by mouth daily   • [DISCONTINUED] insulin lispro (HumaLOG KwikPen) 100 units/mL injection pen Inject 5 units under the skin based on carbohydrate counting after meals   • Continuous Blood Gluc  (FreeStyle Justin 14 Day [place]) VALENTE Use 1 Device 2 (two) times a day (Patient not taking: Reported on 10/4/2022)   • Continuous Blood Gluc Sensor (FreeStyle Justin 14 Day Sensor) MISC Use 1 strip every 14 (fourteen) days (Patient not taking: Reported on 10/4/2022)   • ketoconazole (NIZORAL) 2 % cream Apply topically daily for 7 days     Allergies   Allergen Reactions   • Sulfamethoxazole-Trimethoprim      [facility] - [id number]: mouth swelling     Immunization History   Administered Date(s) Administered   • COVID-19 MODERNA VACC 0 5 ML IM 04/16/2021   • Influenza Quadrivalent, 6-35 Months IM 10/04/2017   • Influenza, recombinant, quadrivalent,injectable, preservative free 09/24/2018, 12/13/2019   • Influenza, seasonal, injectable 12/13/2012   • Pneumococcal Polysaccharide PPV23 12/13/2012   • Tdap 06/25/2013       Objective     /70 (BP Location: Left arm, Patient Position: Sitting, Cuff Size: Adult)   Pulse 97   Temp (!) 97 3 °F (36 3 °C) (Temporal)   Resp 16   Ht 5' 10 87" (1 8 m)   Wt 73 5 kg (162 lb)   SpO2 100%   BMI 22 68 kg/m²     Physical Exam  Cardiovascular:      Rate and Rhythm: Normal rate and regular rhythm  Heart sounds: Normal heart sounds  Comments: Carotids: no bruits  Ext: no edema  Pulmonary:      Effort: Pulmonary effort is normal  No respiratory distress  Breath sounds: No wheezing or rales  Psychiatric:         Behavior: Behavior normal          Thought Content:  Thought content normal        Zach Rodriguez DO

## 2023-02-09 NOTE — ASSESSMENT & PLAN NOTE
Lab Results   Component Value Date    HGBA1C 8 1 (H) 09/24/2022   Initially diagnosed in 2007  For the past few months, patient has not been using basal insulin due to cost    He is only using NovoLog sliding scale with meals (averaging 13 units per dose)  Denies any hypoglycemia  Rapid A1c today 8 5%  Recommend adjusting insulin +2 units/dose  He was also encouraged to keep ready source of glucose handy at all times  We will recheck labs in 3 months followed by appointment  He was referred to endocrinology in the past, but he has not scheduled appointments  We will continue to manage his condition, but if A1c worsens, will again recommend endocrine input

## 2023-02-09 NOTE — ASSESSMENT & PLAN NOTE
History of stable elevation of bilirubin  Most recently from September 1 0 49    We will continue to monitor

## 2023-03-01 DIAGNOSIS — E11.65 TYPE 2 DIABETES MELLITUS WITH HYPERGLYCEMIA, UNSPECIFIED WHETHER LONG TERM INSULIN USE (HCC): ICD-10-CM

## 2023-03-01 RX ORDER — FLASH GLUCOSE SENSOR
1 KIT MISCELLANEOUS
Qty: 6 EACH | Refills: 1 | Status: SHIPPED | OUTPATIENT
Start: 2023-03-01

## 2023-05-18 ENCOUNTER — OFFICE VISIT (OUTPATIENT)
Dept: FAMILY MEDICINE CLINIC | Facility: CLINIC | Age: 63
End: 2023-05-18

## 2023-05-18 VITALS
TEMPERATURE: 97.7 F | HEART RATE: 80 BPM | SYSTOLIC BLOOD PRESSURE: 110 MMHG | DIASTOLIC BLOOD PRESSURE: 70 MMHG | RESPIRATION RATE: 16 BRPM | HEIGHT: 71 IN | OXYGEN SATURATION: 100 % | WEIGHT: 156 LBS | BODY MASS INDEX: 21.84 KG/M2

## 2023-05-18 DIAGNOSIS — E11.65 TYPE 2 DIABETES MELLITUS WITH HYPERGLYCEMIA, WITH LONG-TERM CURRENT USE OF INSULIN (HCC): Primary | ICD-10-CM

## 2023-05-18 DIAGNOSIS — E78.2 MIXED HYPERLIPIDEMIA: ICD-10-CM

## 2023-05-18 DIAGNOSIS — Z79.4 TYPE 2 DIABETES MELLITUS WITH HYPERGLYCEMIA, WITH LONG-TERM CURRENT USE OF INSULIN (HCC): Primary | ICD-10-CM

## 2023-05-18 DIAGNOSIS — F10.10 ALCOHOL ABUSE: ICD-10-CM

## 2023-05-18 DIAGNOSIS — R17 ELEVATED BILIRUBIN: ICD-10-CM

## 2023-05-18 DIAGNOSIS — Z59.9 FINANCIAL DIFFICULTIES: ICD-10-CM

## 2023-05-18 DIAGNOSIS — I10 BENIGN ESSENTIAL HYPERTENSION: ICD-10-CM

## 2023-05-18 PROBLEM — J06.9 UPPER RESPIRATORY TRACT INFECTION: Status: RESOLVED | Noted: 2022-06-13 | Resolved: 2023-05-18

## 2023-05-18 SDOH — ECONOMIC STABILITY - INCOME SECURITY: PROBLEM RELATED TO HOUSING AND ECONOMIC CIRCUMSTANCES, UNSPECIFIED: Z59.9

## 2023-05-18 NOTE — ASSESSMENT & PLAN NOTE
Lab Results   Component Value Date    HGBA1C 8 5 (A) 02/09/2023   Patient states he ran out of his Humalog 3 days ago  He has not gotten it refilled because of cost   He has a prescription plan that maxes out coverage at $1500 per year  We will switch to Novulin 70/30, starting at 8 units twice daily before meals and following his previously use sliding scale  Watch out for signs of hypoglycemia  We will also consult  for prescription assistant's options  Lastly, will refer to endocrinology to see if they can be of assistance with diabetic control and possibly samples  Patient will get labs drawn today    We will call with results

## 2023-05-18 NOTE — PROGRESS NOTES
Name: Rhonda Dandy      : 1960      MRN: [id number]  Encounter Provider: Key Donaldson DO  Encounter Date: 2023   Encounter department: [address] [place] [address]  Type 2 diabetes mellitus with hyperglycemia, with long-term current use of insulin [facility])  Assessment & Plan:    Lab Results   Component Value Date    HGBA1C 8 5 (A) 2023   Patient states he ran out of his Humalog 3 days ago  He has not gotten it refilled because of cost   He has a prescription plan that maxes out coverage at $1500 per year  We will switch to Novulin 70/30, starting at 8 units twice daily before meals and following his previously use sliding scale  Watch out for signs of hypoglycemia  We will also consult  for prescription assistant's options  Lastly, will refer to endocrinology to see if they can be of assistance with diabetic control and possibly samples  Patient will get labs drawn today  We will call with results    Orders:  -     Ambulatory referral to Social work care management program; Future; Expected date: 2023  -     Ambulatory Referral to Endocrinology; Future  -     insulin isophane-insulin regular (NovoLIN 70/30) 100 units/mL injection pen; 8 units Sq BID before breakfast and supper (titrate as per sliding scale)    2  Mixed hyperlipidemia  Assessment & Plan:  Doing well on simvastatin 40 mg daily  Continue diet and exercise  Patient will get labs drawn this morning  We will call with results      3  Alcohol abuse  Assessment & Plan:  Patient still drinking 4-6 light beers every evening  Patient states he can go days without drinking without withdrawal effects  We again had a long conversation regarding his drinking  Offered counseling  Patient declines  We will continue to monitor the situation  4  Benign essential hypertension  Assessment & Plan:  Blood pressure well controlled on lisinopril 2 5 mg daily      5   Elevated bilirubin  Assessment & Plan:  Has been stable  Patient is due for labs  We will get drawn today  We will call with results      6  Financial difficulties  -     Ambulatory referral to Social work care management program; Future; Expected date: 05/18/2023         Patient will labs drawn this morning  We will call with results    3 months, will need CMP/A1c prior (and possible additional labs based on today's results)  Subjective     Patient presents for recheck of chronic medical problems today  Other than fatigue, patient is feeling well  He stopped his Humalog recently due to cost (patient has prescription coverage that maxes out at $1500 per year)  Otherwise he is compliant with medications  He still is drinking 4-6 beers per night  Review of Systems   Constitutional: Positive for fatigue  Respiratory: Negative  Cardiovascular: Negative  Gastrointestinal: Negative  Genitourinary: Negative  Past Medical History:   Diagnosis Date   • Alcohol abuse 12/13/2019   • Diabetes mellitus ([facility] )     IDDM; blood glucose was 200 today at 0600   • Gastroesophageal reflux disease without esophagitis 1/13/2020   • Hypertension    • Type 2 diabetes mellitus ([facility] )     with Hyperglycemia, Last assessed: 8/11/15     Past Surgical History:   Procedure Laterality Date   • NO PAST SURGERIES       Family History   Problem Relation Age of Onset   • No Known Problems Mother    • Stroke Father    • No Known Problems Sister    • No Known Problems Brother    • Diabetes Paternal Aunt    • Diabetes Paternal Uncle      Social History     Socioeconomic History   • Marital status: Single     Spouse name: None   • Number of children: None   • Years of education: None   • Highest education level: None   Occupational History   • Occupation: retired   Tobacco Use   • Smoking status: Never   • Smokeless tobacco: Never   Vaping Use   • Vaping Use: Never used   Substance and Sexual Activity   • Alcohol use:  Yes Alcohol/week: 42 0 standard drinks     Types: 42 Cans of beer per week   • Drug use: No   • Sexual activity: Not Currently     Partners: Female   Other Topics Concern   • None   Social History Narrative   • None     Social Determinants of Health     Financial Resource Strain: Not on file   Food Insecurity: Not on file   Transportation Needs: Not on file   Physical Activity: Not on file   Stress: Not on file   Social Connections: Not on file   Intimate Partner Violence: Not on file   Housing Stability: Not on file     Current Outpatient Medications on File Prior to Visit   Medication Sig   • Continuous Blood Gluc Sensor (FreeStyle Justin 14 Day Sensor) MISC Use 1 strip every 14 (fourteen) days   • Insulin Glargine-yfgn (Semglee, yfgn,) 100 UNIT/ML SOPN Inject 40 Units under the skin in the morning   • lisinopril (ZESTRIL) 2 5 mg tablet Take 1 tablet (2 5 mg total) by mouth daily   • simvastatin (ZOCOR) 40 mg tablet Take 1 tablet (40 mg total) by mouth daily   • aspirin 81 MG tablet Take 81 mg by mouth daily   (Patient not taking: Reported on 5/18/2023)   • Continuous Blood Gluc  (FreeStyle Justin 14 Day [place]) VALENTE Use 1 Device 2 (two) times a day (Patient not taking: Reported on 10/4/2022)   • glucose blood (ONE TOUCH ULTRA TEST) test strip Test blood sugars 3 times a day (Patient not taking: Reported on 5/18/2023)   • ketoconazole (NIZORAL) 2 % cream Apply topically daily for 7 days   • [DISCONTINUED] insulin lispro (HumaLOG KwikPen) 100 units/mL injection pen Inject 15 units under the skin based on carbohydrate counting after meals (Patient not taking: Reported on 5/18/2023)     Allergies   Allergen Reactions   • Sulfamethoxazole-Trimethoprim      [facility] - [id number]: mouth swelling     Immunization History   Administered Date(s) Administered   • COVID-19 MODERNA VACC 0 5 ML IM 04/16/2021   • Influenza Quadrivalent, 6-35 Months IM 10/04/2017   • Influenza, recombinant, quadrivalent,injectable, "preservative free 09/24/2018, 12/13/2019   • Influenza, seasonal, injectable 12/13/2012   • Pneumococcal Polysaccharide PPV23 12/13/2012   • Tdap 06/25/2013       Objective     /70 (BP Location: Right arm, Patient Position: Sitting, Cuff Size: Adult)   Pulse 80   Temp 97 7 °F (36 5 °C) (Temporal)   Resp 16   Ht 5' 10 87\" (1 8 m)   Wt 70 8 kg (156 lb)   SpO2 100%   BMI 21 84 kg/m²     Physical Exam  Cardiovascular:      Rate and Rhythm: Normal rate and regular rhythm  Heart sounds: Normal heart sounds  Comments: Carotids: no bruits  Ext: no edema  Pulmonary:      Effort: Pulmonary effort is normal  No respiratory distress  Breath sounds: No wheezing or rales  Psychiatric:         Behavior: Behavior normal          Thought Content:  Thought content normal        Otelia Gazella, DO  "

## 2023-05-18 NOTE — ASSESSMENT & PLAN NOTE
Doing well on simvastatin 40 mg daily  Continue diet and exercise  Patient will get labs drawn this morning    We will call with results

## 2023-05-18 NOTE — ASSESSMENT & PLAN NOTE
Patient still drinking 4-6 light beers every evening  Patient states he can go days without drinking without withdrawal effects  We again had a long conversation regarding his drinking  Offered counseling  Patient declines  We will continue to monitor the situation

## 2023-05-25 ENCOUNTER — PATIENT OUTREACH (OUTPATIENT)
Dept: FAMILY MEDICINE CLINIC | Facility: CLINIC | Age: 63
End: 2023-05-25

## 2023-05-25 NOTE — PROGRESS NOTES
TSERING HERNÁNDEZ received a referral from patient's PCP due to some financial difficulties  TSERING HERNÁNDEZ reviewed patient's chart and called patient ([phone])  Patient did not answer, TSERING HERNÁNDEZ left a message including TSERING HERNÁNDEZ contact information and requested a call back  TSERING HERNÁNDEZ will attempt to outreach again at a late date

## 2023-05-31 ENCOUNTER — PATIENT OUTREACH (OUTPATIENT)
Dept: FAMILY MEDICINE CLINIC | Facility: CLINIC | Age: 63
End: 2023-05-31

## 2023-05-31 NOTE — PROGRESS NOTES
TSERING HERNÁNDEZ received a referral from patient's PCP due to some financial difficulties  TSERING HERNÁNDEZ reviewed patient's chart and called patient ([phone]) a second time  Patient did not answer, TSERING HERNÁNDEZ left a message including TSERING HERNÁNDEZ contact information and requested a call back  TSERING HERNÁNDEZ will place unable to reach letter in outgoing mail  TSERING HERNÁNDEZ will close due to being unable to reach patient  Please re consult TSERING HERNÁNDEZ as needed

## 2023-05-31 NOTE — LETTER
[id number]   [facility] [address]  [phone]    Re: [place]   5/31/2023       Dear Tosha Garcia am a [address] [place] [place] [address] [facility] Work  and wanted to be certain you had information to contact me should you desire assistance with or have questions about non-medical aspects of your care such as [but not limited to] medical insurance, housing, transportation, material needs, or emergency needs  If I do not have an answer I will assist you in finding the appropriate agency or individual who can help  Please feel free to contact me at [phone] Thank You      Sincerely,         Vickie Blackwell , MSW, LSW

## 2023-06-09 ENCOUNTER — TELEPHONE (OUTPATIENT)
Dept: ENDOCRINOLOGY | Facility: CLINIC | Age: 63
End: 2023-06-09

## 2023-06-09 ENCOUNTER — CONSULT (OUTPATIENT)
Dept: ENDOCRINOLOGY | Facility: CLINIC | Age: 63
End: 2023-06-09
Payer: COMMERCIAL

## 2023-06-09 VITALS
DIASTOLIC BLOOD PRESSURE: 72 MMHG | HEART RATE: 80 BPM | HEIGHT: 71 IN | WEIGHT: 153 LBS | SYSTOLIC BLOOD PRESSURE: 130 MMHG | BODY MASS INDEX: 21.42 KG/M2

## 2023-06-09 DIAGNOSIS — Z79.4 TYPE 2 DIABETES MELLITUS WITH HYPERGLYCEMIA, WITH LONG-TERM CURRENT USE OF INSULIN (HCC): ICD-10-CM

## 2023-06-09 DIAGNOSIS — E11.65 TYPE 2 DIABETES MELLITUS WITH HYPERGLYCEMIA, WITH LONG-TERM CURRENT USE OF INSULIN (HCC): ICD-10-CM

## 2023-06-09 DIAGNOSIS — E10.65 TYPE 1 DIABETES MELLITUS WITH HYPERGLYCEMIA (HCC): Primary | ICD-10-CM

## 2023-06-09 LAB — SL AMB POCT HEMOGLOBIN AIC: 9.9 (ref ?–6.5)

## 2023-06-09 PROCEDURE — 99204 OFFICE O/P NEW MOD 45 MIN: CPT | Performed by: INTERNAL MEDICINE

## 2023-06-09 PROCEDURE — 83036 HEMOGLOBIN GLYCOSYLATED A1C: CPT | Performed by: INTERNAL MEDICINE

## 2023-06-09 RX ORDER — INSULIN ASPART 100 [IU]/ML
6 INJECTION, SOLUTION INTRAVENOUS; SUBCUTANEOUS
Qty: 15 ML | Refills: 2 | Status: SHIPPED | OUTPATIENT
Start: 2023-06-09

## 2023-06-09 RX ORDER — UREA 10 %
1 LOTION (ML) TOPICAL
COMMUNITY

## 2023-06-09 RX ORDER — INSULIN DEGLUDEC 200 U/ML
22 INJECTION, SOLUTION SUBCUTANEOUS DAILY
Qty: 3 ML | Refills: 0 | Status: SHIPPED | COMMUNITY
Start: 2023-06-09 | End: 2023-06-12

## 2023-06-09 RX ORDER — INSULIN DEGLUDEC INJECTION 100 U/ML
22 INJECTION, SOLUTION SUBCUTANEOUS
Qty: 15 ML | Refills: 2 | Status: SHIPPED | OUTPATIENT
Start: 2023-06-09

## 2023-06-09 NOTE — TELEPHONE ENCOUNTER
[employer] patient assistance aplication was done during his office visit for [place] and Novolog Insulin, Dr Lennie Mckeon signed the application, we made a copy and advise pt to fill out his part and fax or mail the form the [employer]

## 2023-06-09 NOTE — PROGRESS NOTES
Nitin Hamilton 58 y o  male MRN: [id number]    Encounter: [id number]      Assessment/Plan     Assessment: This is a 58y o -year-old male with type 1 diabetes mellitus, sHTN, HLD, GERD, alcohol use who presents to establish care for type 1 diabetes mellitus    Plan:  Type 1 diabetes mellitus  HbA1c 9 9 today from 8 5, insulin antibody 11 (2020)  We have recommended to stop Novolin 70/30 and start Tresiba 22 units at bedtime and, Novolog 6 Units with meals  We have given Tresiba sample to the patient and have also filled out patient assistance forms for him to obtain [place] and NovoLog as it was cost prohibitive in the past  Discussed with the patient that it is important to take the long-acting insulin as he may be at risk of DKA given his history of type 1 diabetes mellitus  Also discussed with the patient that alcohol use can put him at risk of hypoglycemia  We have referred the patient to diabetes educator and referred to ophthalmology for diabetes retinopathy screening  Recommended the patient to start using vazquez and connect with the office  CC: Diabetes    History of Present Illness     HPI:  This is a 58y o -year-old male with type 1 diabetes mellitus, sHTN, HLD, GERD, alcohol use who presents to establish care for type 1 diabetes mellitus    Reports to have polyuria, nocturia (twice a night)  He has had 3 episodes of hypoglycemia with sx of diaphoresis  Denies of any weight loss, polydipsia and blurry vision  Denies of any recent hospitalizations/ED visits for hypo/hyperglycemia    Complications: No retinopathy, neuropathy, nephropathy, CAD, CVA      Diabetes Hx: was diagnosed in 2007, has been on insulin since 2007, had an episode of DKA in 2007, had insulin antibody 11 in 2020    Current regimen: Takes Novolin 70/30 8-12 Units before dinner, was prescribed to take Novolin 70/30 8 units before breakfast and before dinner    BG monitoring: had vazquez (?freestyle) in the past, it comes off when he runs in to something  He has additional sensors at home  He now uses glucometer to monitor BG, measures it once a day, BG at bedtime has been 300s-500s  BG was 260 this morning  Does not have glucagon at home    Diet: 3 meals/day, biggest meal is lunch  Exercise: walks a lot    SHTN: Lisinopril 2 5 mg   HLD: simvastatin 40 mg    Optho: due, last exam was one year  Podiatry: follows up with Podiatry    Thyroid disorder: none  Pancreatitis: none      Review of Systems   Constitutional: Negative for activity change, appetite change and fatigue  HENT: Negative for congestion and sore throat  Eyes: Negative for redness and visual disturbance  Respiratory: Negative for cough and shortness of breath  Cardiovascular: Negative for palpitations and leg swelling  Gastrointestinal: Negative for abdominal pain, constipation, diarrhea, nausea and vomiting  Endocrine: Positive for polyuria  Negative for cold intolerance, heat intolerance, polydipsia and polyphagia  Genitourinary: Negative for difficulty urinating and dysuria  Musculoskeletal: Negative for gait problem and neck pain  Skin: Negative for color change  Neurological: Negative for dizziness and syncope  Psychiatric/Behavioral: Negative for agitation and behavioral problems  All other systems reviewed and are negative        Historical Information   Past Medical History:   Diagnosis Date   • Alcohol abuse 12/13/2019   • Diabetes mellitus ([facility] )     IDDM; blood glucose was 200 today at 0600   • Gastroesophageal reflux disease without esophagitis 1/13/2020   • Hypertension    • Type 2 diabetes mellitus ([facility] )     with Hyperglycemia, Last assessed: 8/11/15     Past Surgical History:   Procedure Laterality Date   • COLONOSCOPY       Social History   Social History     Substance and Sexual Activity   Alcohol Use Yes   • Alcohol/week: 42 0 standard drinks of alcohol   • Types: 42 Cans of beer per week    Comment: 4-6 rebecca every other day     Social "History     Substance and Sexual Activity   Drug Use No     Social History     Tobacco Use   Smoking Status Never   Smokeless Tobacco Never     Family History:   Family History   Problem Relation Age of Onset   • No Known Problems Mother    • Stroke Father    • No Known Problems Sister    • No Known Problems Brother    • Diabetes Paternal Aunt    • Diabetes Paternal Uncle        Meds/Allergies   Current Outpatient Medications   Medication Sig Dispense Refill   • aspirin 81 MG tablet Take 81 mg by mouth daily     • Calcium Carbonate-Vit D-Min (CALCIUM 1200 PO) Take by mouth     • glucose blood (ONE TOUCH ULTRA TEST) test strip Test blood sugars 3 times a day 100 each 2   • Insulin Glargine-yfgn (Semglee, yfgn,) 100 UNIT/ML SOPN Inject 40 Units under the skin in the morning 75 mL 3   • insulin isophane-insulin regular (NovoLIN 70/30) 100 units/mL injection pen 8 units Sq BID before breakfast and supper (titrate as per sliding scale) 15 mL 5   • ketoconazole (NIZORAL) 2 % cream Apply topically daily for 7 days 15 g 0   • lisinopril (ZESTRIL) 2 5 mg tablet Take 1 tablet (2 5 mg total) by mouth daily 30 tablet 5   • Magnesium 100 MG CAPS Take by mouth     • melatonin 1 mg Take 1 mg by mouth daily at bedtime     • simvastatin (ZOCOR) 40 mg tablet Take 1 tablet (40 mg total) by mouth daily 30 tablet 5   • Zinc Sulfate (ZINC 15 PO) Take by mouth     • Continuous Blood Gluc  (FreeStyle Justin 14 Day Brenton) VALENTE Use 1 Device 2 (two) times a day (Patient not taking: Reported on 10/4/2022) 1 each 0   • Continuous Blood Gluc Sensor (FreeStyle Justin 14 Day Sensor) MISC Use 1 strip every 14 (fourteen) days (Patient not taking: Reported on 6/9/2023) 6 each 1     No current facility-administered medications for this visit       Allergies   Allergen Reactions   • Sulfamethoxazole-Trimethoprim      Annotation - [id number]: mouth swelling       Objective   Vitals: Blood pressure 130/72, pulse 80, height 5' 10 87\" (1 8 m), weight " "69 4 kg (153 lb)  Physical Exam  Constitutional:       Appearance: Normal appearance  HENT:      Head: Normocephalic and atraumatic  Cardiovascular:      Rate and Rhythm: Normal rate and regular rhythm  Pulses: Normal pulses  Heart sounds: Normal heart sounds  No murmur heard  No gallop  Pulmonary:      Effort: Pulmonary effort is normal       Breath sounds: Normal breath sounds  No wheezing or rales  Abdominal:      General: Bowel sounds are normal       Palpations: Abdomen is soft  Tenderness: There is no abdominal tenderness  Musculoskeletal:         General: No swelling  Cervical back: Normal range of motion  No tenderness  Right lower leg: No edema  Left lower leg: No edema  Lymphadenopathy:      Cervical: No cervical adenopathy  Skin:     General: Skin is warm  Neurological:      Mental Status: He is alert and oriented to person, place, and time  Mental status is at baseline  Psychiatric:         Mood and Affect: Mood normal          Behavior: Behavior normal          The history was obtained from the review of the chart, patient  Lab Results:   Lab Results   Component Value Date/Time    Albumin 3 6 09/24/2022 08:00 AM    ALT 23 09/24/2022 08:00 AM    AST 8 09/24/2022 08:00 AM    BUN 10 09/24/2022 08:00 AM    Chloride 103 09/24/2022 08:00 AM    CO2 28 09/24/2022 08:00 AM    Creatinine 0 75 09/24/2022 08:00 AM    HDL, Direct 41 09/24/2022 08:00 AM    Hemoglobin A1C 9 9 (A) 06/09/2023 09:12 AM    Hemoglobin A1C 8 5 (A) 02/09/2023 09:00 AM    Hemoglobin A1C 8 1 (H) 09/24/2022 08:00 AM    Potassium 4 1 09/24/2022 08:00 AM    Total Protein 7 2 09/24/2022 08:00 AM    Triglycerides 85 09/24/2022 08:00 AM           Imaging Studies: I have personally reviewed pertinent reports  Portions of the record may have been created with voice recognition software   Occasional wrong word or \"sound a like\" substitutions may have occurred due to the inherent limitations " of voice recognition software  Read the chart carefully and recognize, using context, where substitutions have occurred

## 2023-07-10 ENCOUNTER — TELEPHONE (OUTPATIENT)
Dept: ENDOCRINOLOGY | Facility: CLINIC | Age: 63
End: 2023-07-10

## 2023-07-10 NOTE — TELEPHONE ENCOUNTER
Pt called today to let you know that he is not able to afford his insulin, and he does not qualify for the patient assistance program ,  Pt would like to go back to continue with the Novolin 70/30,  10 units Sq BID before breakfast and supper (titrate as per sliding scale). Hailey Duarte .

## 2023-07-12 DIAGNOSIS — E10.65 TYPE 1 DIABETES MELLITUS WITH HYPERGLYCEMIA (HCC): ICD-10-CM

## 2023-07-14 DIAGNOSIS — E10.65 TYPE 1 DIABETES MELLITUS WITH HYPERGLYCEMIA (HCC): Primary | ICD-10-CM

## 2023-07-14 RX ORDER — INSULIN GLARGINE-YFGN 100 [IU]/ML
22 INJECTION, SOLUTION SUBCUTANEOUS
Qty: 15 ML | Refills: 1 | Status: SHIPPED | OUTPATIENT
Start: 2023-07-14

## 2023-07-14 RX ORDER — INSULIN LISPRO 200 [IU]/ML
6 INJECTION, SOLUTION SUBCUTANEOUS
Qty: 6 ML | Refills: 2 | Status: SHIPPED | OUTPATIENT
Start: 2023-07-14

## 2023-08-02 ENCOUNTER — TELEPHONE (OUTPATIENT)
Dept: FAMILY MEDICINE CLINIC | Facility: CLINIC | Age: 63
End: 2023-08-02

## 2023-08-02 NOTE — TELEPHONE ENCOUNTER
Patient established care with Dr. Josh Luke @ [facility] 8/1/23. Please remove Dr. Phillip Lemus as PCP.

## 2023-08-09 NOTE — TELEPHONE ENCOUNTER
08/09/23 12:05 PM        Please cancel upcoming appointment and resubmit you request.      Thank you  Maria R Leyva

## 2023-08-11 ENCOUNTER — RA CDI HCC (OUTPATIENT)
Dept: OTHER | Facility: HOSPITAL | Age: 63
End: 2023-08-11

## 2023-08-11 NOTE — PROGRESS NOTES
[address] [facility] coding opportunities       Chart reviewed, no opportunity found: CHART REVIEWED, NO OPPORTUNITY FOUND        Patients Insurance        Commercial Insurance: [employer]

## 2023-08-31 NOTE — TELEPHONE ENCOUNTER
08/31/23 12:53 PM        The office's request has been received, reviewed, and the patient chart updated. The PCP has successfully been removed with a patient attribution note. This message will now be completed.         Thank you  Simin New

## 2024-01-31 ENCOUNTER — TELEPHONE (OUTPATIENT)
Dept: FAMILY MEDICINE CLINIC | Facility: CLINIC | Age: 64
End: 2024-01-31

## 2024-09-18 ENCOUNTER — TELEPHONE (OUTPATIENT)
Dept: GASTROENTEROLOGY | Facility: CLINIC | Age: 64
End: 2024-09-18

## 2024-09-18 NOTE — TELEPHONE ENCOUNTER
Pt is due for a colonoscopy with Dr Patel for hx of ta polyps. I lmom for pt to please call back to schedule. Will call again if do not hear back from pt.

## 2025-08-06 ENCOUNTER — TELEPHONE (OUTPATIENT)
Dept: FAMILY MEDICINE CLINIC | Facility: CLINIC | Age: 65
End: 2025-08-06

## 2025-08-06 ENCOUNTER — OFFICE VISIT (OUTPATIENT)
Dept: FAMILY MEDICINE CLINIC | Facility: CLINIC | Age: 65
End: 2025-08-06
Payer: COMMERCIAL

## 2025-08-06 VITALS
HEIGHT: 71 IN | SYSTOLIC BLOOD PRESSURE: 128 MMHG | BODY MASS INDEX: 23.32 KG/M2 | TEMPERATURE: 97.8 F | DIASTOLIC BLOOD PRESSURE: 62 MMHG | WEIGHT: 166.6 LBS | HEART RATE: 87 BPM | OXYGEN SATURATION: 97 %

## 2025-08-06 DIAGNOSIS — E78.2 MIXED HYPERLIPIDEMIA: ICD-10-CM

## 2025-08-06 DIAGNOSIS — Z13.0 SCREENING FOR DEFICIENCY ANEMIA: ICD-10-CM

## 2025-08-06 DIAGNOSIS — Z12.5 SCREENING FOR PROSTATE CANCER: ICD-10-CM

## 2025-08-06 DIAGNOSIS — E10.65 TYPE 1 DIABETES MELLITUS WITH HYPERGLYCEMIA (HCC): Primary | ICD-10-CM

## 2025-08-06 DIAGNOSIS — Z13.29 SCREENING FOR THYROID DISORDER: ICD-10-CM

## 2025-08-06 DIAGNOSIS — F10.10 ALCOHOL ABUSE: ICD-10-CM

## 2025-08-06 DIAGNOSIS — E10.42 DIABETIC POLYNEUROPATHY ASSOCIATED WITH TYPE 1 DIABETES MELLITUS (HCC): ICD-10-CM

## 2025-08-06 PROBLEM — B35.4 TINEA CORPORIS: Status: RESOLVED | Noted: 2022-10-04 | Resolved: 2025-08-06

## 2025-08-06 PROBLEM — L03.032 PARONYCHIA OF GREAT TOE OF LEFT FOOT: Status: RESOLVED | Noted: 2022-10-04 | Resolved: 2025-08-06

## 2025-08-06 LAB
CREAT UR-MCNC: 146.7 MG/DL
LEFT EYE DIABETIC RETINOPATHY: ABNORMAL
LEFT EYE IMAGE QUALITY: ABNORMAL
LEFT EYE MACULAR EDEMA: POSITIVE
LEFT EYE OTHER RETINOPATHY: ABNORMAL
MICROALBUMIN UR-MCNC: 21.3 MG/L
MICROALBUMIN/CREAT 24H UR: 15 MG/G CREATININE (ref 0–30)
RIGHT EYE DIABETIC RETINOPATHY: ABNORMAL
RIGHT EYE IMAGE QUALITY: ABNORMAL
RIGHT EYE MACULAR EDEMA: POSITIVE
RIGHT EYE OTHER RETINOPATHY: ABNORMAL
SEVERITY (EYE EXAM): ABNORMAL
SL AMB POCT HEMOGLOBIN AIC: 9.5 (ref ?–6.5)

## 2025-08-06 PROCEDURE — 99214 OFFICE O/P EST MOD 30 MIN: CPT

## 2025-08-06 PROCEDURE — 83036 HEMOGLOBIN GLYCOSYLATED A1C: CPT

## 2025-08-06 PROCEDURE — 92250 FUNDUS PHOTOGRAPHY W/I&R: CPT

## 2025-08-06 RX ORDER — ACYCLOVIR 400 MG/1
1 TABLET ORAL ONCE
Qty: 1 EACH | Refills: 0 | Status: SHIPPED | OUTPATIENT
Start: 2025-08-06 | End: 2025-08-06

## 2025-08-06 RX ORDER — ACYCLOVIR 400 MG/1
1 TABLET ORAL
Qty: 9 EACH | Refills: 3 | Status: SHIPPED | OUTPATIENT
Start: 2025-08-06 | End: 2025-08-06

## 2025-08-06 RX ORDER — ACYCLOVIR 400 MG/1
1 TABLET ORAL
Qty: 9 EACH | Refills: 3 | Status: SHIPPED | OUTPATIENT
Start: 2025-08-06

## 2025-08-06 RX ORDER — SIMVASTATIN 40 MG
40 TABLET ORAL DAILY
Qty: 90 TABLET | Refills: 1 | Status: SHIPPED | OUTPATIENT
Start: 2025-08-06

## 2025-08-06 RX ORDER — INSULIN LISPRO 200 [IU]/ML
6 INJECTION, SOLUTION SUBCUTANEOUS
Qty: 6 ML | Refills: 2 | Status: SHIPPED | OUTPATIENT
Start: 2025-08-06

## 2025-08-07 ENCOUNTER — TELEPHONE (OUTPATIENT)
Age: 65
End: 2025-08-07

## 2025-08-14 LAB
ALBUMIN SERPL-MCNC: 4.3 G/DL (ref 3.6–5.1)
ALBUMIN/GLOB SERPL: 1.7 (CALC) (ref 1–2.5)
ALP SERPL-CCNC: 71 U/L (ref 35–144)
ALT SERPL-CCNC: 16 U/L (ref 9–46)
AST SERPL-CCNC: 13 U/L (ref 10–35)
BASOPHILS # BLD AUTO: 41 CELLS/UL (ref 0–200)
BASOPHILS NFR BLD AUTO: 0.7 %
BILIRUB SERPL-MCNC: 1.5 MG/DL (ref 0.2–1.2)
BUN SERPL-MCNC: 16 MG/DL (ref 7–25)
BUN/CREAT SERPL: 24 (CALC) (ref 6–22)
CALCIUM SERPL-MCNC: 9.3 MG/DL (ref 8.6–10.3)
CHLORIDE SERPL-SCNC: 101 MMOL/L (ref 98–110)
CHOLEST SERPL-MCNC: 189 MG/DL
CHOLEST/HDLC SERPL: 3.2 (CALC)
CO2 SERPL-SCNC: 32 MMOL/L (ref 20–32)
CREAT SERPL-MCNC: 0.67 MG/DL (ref 0.7–1.35)
EOSINOPHIL # BLD AUTO: 157 CELLS/UL (ref 15–500)
EOSINOPHIL NFR BLD AUTO: 2.7 %
ERYTHROCYTE [DISTWIDTH] IN BLOOD BY AUTOMATED COUNT: 12.1 % (ref 11–15)
GFR/BSA.PRED SERPLBLD CYS-BASED-ARV: 104 ML/MIN/1.73M2
GLOBULIN SER CALC-MCNC: 2.5 G/DL (CALC) (ref 1.9–3.7)
GLUCOSE SERPL-MCNC: 203 MG/DL (ref 65–99)
HCT VFR BLD AUTO: 44.7 % (ref 38.5–50)
HDLC SERPL-MCNC: 59 MG/DL
HGB BLD-MCNC: 14 G/DL (ref 13.2–17.1)
LDLC SERPL CALC-MCNC: 113 MG/DL (CALC)
LYMPHOCYTES # BLD AUTO: 1699 CELLS/UL (ref 850–3900)
LYMPHOCYTES NFR BLD AUTO: 29.3 %
MCH RBC QN AUTO: 28.5 PG (ref 27–33)
MCHC RBC AUTO-ENTMCNC: 31.3 G/DL (ref 32–36)
MCV RBC AUTO: 90.9 FL (ref 80–100)
MONOCYTES # BLD AUTO: 696 CELLS/UL (ref 200–950)
MONOCYTES NFR BLD AUTO: 12 %
NEUTROPHILS # BLD AUTO: 3207 CELLS/UL (ref 1500–7800)
NEUTROPHILS NFR BLD AUTO: 55.3 %
NONHDLC SERPL-MCNC: 130 MG/DL (CALC)
PLATELET # BLD AUTO: 251 THOUSAND/UL (ref 140–400)
PMV BLD REES-ECKER: 10.1 FL (ref 7.5–12.5)
POTASSIUM SERPL-SCNC: 4.3 MMOL/L (ref 3.5–5.3)
PROT SERPL-MCNC: 6.8 G/DL (ref 6.1–8.1)
PSA SERPL-MCNC: 0.45 NG/ML
RBC # BLD AUTO: 4.92 MILLION/UL (ref 4.2–5.8)
SODIUM SERPL-SCNC: 136 MMOL/L (ref 135–146)
TRIGL SERPL-MCNC: 75 MG/DL
TSH SERPL-ACNC: 1.33 MIU/L (ref 0.4–4.5)
WBC # BLD AUTO: 5.8 THOUSAND/UL (ref 3.8–10.8)